# Patient Record
Sex: MALE | Race: WHITE | Employment: FULL TIME | URBAN - METROPOLITAN AREA
[De-identification: names, ages, dates, MRNs, and addresses within clinical notes are randomized per-mention and may not be internally consistent; named-entity substitution may affect disease eponyms.]

---

## 2017-05-01 PROBLEM — Z12.5 SCREENING PSA (PROSTATE SPECIFIC ANTIGEN): Status: ACTIVE | Noted: 2017-05-01

## 2018-05-07 PROBLEM — R68.82 DIMINISHED LIBIDO: Status: ACTIVE | Noted: 2018-05-07

## 2018-11-08 PROBLEM — R80.9 MICROALBUMINURIA: Status: ACTIVE | Noted: 2018-11-08

## 2018-11-11 PROBLEM — Z13.5 SCREENING FOR DIABETIC RETINOPATHY: Status: ACTIVE | Noted: 2018-11-11

## 2018-11-11 PROBLEM — Z23 NEED FOR SHINGLES VACCINE: Status: ACTIVE | Noted: 2018-11-11

## 2018-11-13 PROBLEM — M53.3 SACRAL PAIN: Status: ACTIVE | Noted: 2018-11-13

## 2019-02-16 ENCOUNTER — HOSPITAL ENCOUNTER (OUTPATIENT)
Dept: GENERAL RADIOLOGY | Age: 54
Discharge: HOME | End: 2019-02-16
Payer: COMMERCIAL

## 2019-02-16 DIAGNOSIS — M53.3 SACRAL PAIN: ICD-10-CM

## 2019-02-16 PROBLEM — M25.552 LEFT HIP PAIN: Status: ACTIVE | Noted: 2019-02-16

## 2019-02-16 PROBLEM — M16.12 PRIMARY OSTEOARTHRITIS OF LEFT HIP: Status: ACTIVE | Noted: 2019-02-16

## 2019-02-16 PROCEDURE — 73502 X-RAY EXAM HIP UNI 2-3 VIEWS: CPT

## 2019-09-22 PROBLEM — Z76.89 ENCOUNTER FOR WEIGHT MANAGEMENT: Status: ACTIVE | Noted: 2019-09-22

## 2019-12-29 PROBLEM — M54.2 NECK PAIN: Status: ACTIVE | Noted: 2019-12-29

## 2019-12-29 PROBLEM — K64.9 HEMORRHOIDS: Status: ACTIVE | Noted: 2019-12-29

## 2020-06-14 PROBLEM — M54.2 NECK PAIN: Status: RESOLVED | Noted: 2019-12-29 | Resolved: 2020-06-14

## 2020-06-14 PROBLEM — R80.9 MICROALBUMINURIA: Status: RESOLVED | Noted: 2018-11-08 | Resolved: 2020-06-14

## 2020-06-14 PROBLEM — M53.3 SACRAL PAIN: Status: RESOLVED | Noted: 2018-11-13 | Resolved: 2020-06-14

## 2020-06-14 PROBLEM — Z12.5 SCREENING FOR PROSTATE CANCER: Status: ACTIVE | Noted: 2020-06-14

## 2020-06-14 PROBLEM — R93.1 ELEVATED CORONARY ARTERY CALCIUM SCORE: Status: ACTIVE | Noted: 2020-06-14

## 2020-12-16 PROBLEM — G89.29 CHRONIC LEFT-SIDED LOW BACK PAIN WITH LEFT-SIDED SCIATICA: Status: ACTIVE | Noted: 2020-12-16

## 2020-12-16 PROBLEM — Z79.899 HIGH RISK MEDICATION USE: Status: ACTIVE | Noted: 2020-12-16

## 2020-12-16 PROBLEM — M54.42 CHRONIC LEFT-SIDED LOW BACK PAIN WITH LEFT-SIDED SCIATICA: Status: ACTIVE | Noted: 2020-12-16

## 2021-01-02 ENCOUNTER — HOSPITAL ENCOUNTER (OUTPATIENT)
Dept: GENERAL RADIOLOGY | Age: 56
Discharge: HOME | End: 2021-01-02
Payer: COMMERCIAL

## 2021-01-02 DIAGNOSIS — G89.29 CHRONIC LEFT-SIDED LOW BACK PAIN WITH LEFT-SIDED SCIATICA: ICD-10-CM

## 2021-01-02 DIAGNOSIS — M54.42 CHRONIC LEFT-SIDED LOW BACK PAIN WITH LEFT-SIDED SCIATICA: ICD-10-CM

## 2021-01-02 PROCEDURE — 72100 X-RAY EXAM L-S SPINE 2/3 VWS: CPT

## 2021-01-13 PROBLEM — Z86.16 HISTORY OF 2019 NOVEL CORONAVIRUS DISEASE (COVID-19): Status: ACTIVE | Noted: 2020-12-08

## 2021-05-24 ENCOUNTER — TRANSCRIBE ORDER (OUTPATIENT)
Dept: OCCUPATIONAL MEDICINE | Age: 56
End: 2021-05-24

## 2021-05-24 ENCOUNTER — HOSPITAL ENCOUNTER (OUTPATIENT)
Dept: OCCUPATIONAL MEDICINE | Age: 56
Discharge: HOME | End: 2021-05-24

## 2021-05-24 DIAGNOSIS — T14.90XA INJURY: Primary | ICD-10-CM

## 2021-05-24 DIAGNOSIS — T14.90XA INJURY: ICD-10-CM

## 2021-07-12 NOTE — H&P (VIEW-ONLY)
Name: Homero Carrington  YOB: 1965  Gender: male  MRN: [id number]      CC: Shoulder Pain (L shoulder pre-op appt)       HPI: Homero Carrington is a 61 y.o. male who presents with Shoulder Pain (L shoulder pre-op appt)  . Returns today for preop appointment on his left shoulder. \    Current Outpatient Medications:     oxyCODONE IR (ROXICODONE) 5 mg immediate release tablet, Take 1-2 Tablets by mouth every four (4) hours as needed for Pain for up to 14 days. Max Daily Amount: 60 mg., Disp: 40 Tablet, Rfl: 0    dapagliflozin (Farxiga) 10 mg tab tablet, Take 1 Tablet by mouth daily. , Disp: 90 Tablet, Rfl: 3    Janumet XR 50-1,000 mg TM24, TAKE 2 TABLETS BY MOUTH EVERY DAY, Disp: 180 Tablet, Rfl: 3    celecoxib (CELEBREX) 200 mg capsule, TAKE 1 CAPSULE BY MOUTH TWICE A DAY, Disp: 180 Capsule, Rfl: 3    lisinopriL (PRINIVIL, ZESTRIL) 2.5 mg tablet, TAKE 1 TABLET BY MOUTH EVERY DAY, Disp: 90 Tablet, Rfl: 3    rosuvastatin (CRESTOR) 20 mg tablet, TAKE 1 TABLET BY MOUTH EVERY DAY AT NIGHT, Disp: 90 Tab, Rfl: 3    escitalopram oxalate (LEXAPRO) 10 mg tablet, TAKE 1 TABLET BY MOUTH EVERY DAY, Disp: 90 Tab, Rfl: 3    insulin detemir U-100 (Levemir FlexTouch U-100 Insuln) 100 unit/mL (3 mL) inpn, INJECT 50 UNITS SUBCUTANEOUSLY AT BEDTIME, Disp: 15 Pen, Rfl: 3    glucose blood VI test strips (Accu-Chek Maria Guadalupe Plus test strp) strip, USE AS DIRECTED TO TEST BLOOD SUGARS ONCE DAILY FOR DMII E11.65, Disp: 100 Strip, Rfl: 3    Insulin Needles, Disposable, (BD Ultra-Fine Short Pen Needle) 31 gauge x 5/16\" ndle, Use one needle with each use, Disp: 100 Pen Needle, Rfl: 3    gabapentin (NEURONTIN) 300 mg capsule, Take one cap in AM, on cap PM and two caps at bedtime, Disp: 120 Cap, Rfl: 2    repaglinide (PRANDIN) 2 mg tablet, TAKE 1 TAB BY MOUTH BEFORE BREAKFAST AND DINNER., Disp: 180 Tab, Rfl: 3    colchicine (Colcrys) 0.6 mg tablet, Take 1 Tab by mouth daily.  Take 2 pills on first day, then 1 pill daily thereafter. Indications: acute inflammation of the joints due to gout attack, Disp: 7 Tab, Rfl: 1    Blood-Glucose Meter (ACCU-CHEK ARMANDO PLUS METER) misc, Use as directed to test blood sugars once daily for DMII E11.65, Disp: 1 Each, Rfl: 0    lancets (ACCU-CHEK SOFTCLIX LANCETS) misc, Use as directed to test blood sugars once daily for DMII E11.65, Disp: 100 Each, Rfl: 3    Lancing Device with Lancets (ACCU-CHEK SOFT DEV LANCETS) kit, Use as directed to test blood sugars once daily for DMII E11.65, Disp: 1 Kit, Rfl: 0    omega-3 fatty acids-vitamin e (FISH OIL) 1,000 mg cap, Take 2 Caps by mouth., Disp: , Rfl:     cholecalciferol, vitamin D3, (VITAMIN D3) 2,000 unit tab, Take  by mouth., Disp: , Rfl:     aspirin delayed-release 81 mg tablet, Take  by mouth daily. , Disp: , Rfl:     diclofenac (VOLTAREN) 1 % gel, Apply 4 g to affected area four (4) times daily.  (Patient not taking: Reported on 6/22/2021), Disp: 1 Each, Rfl: 2  Allergies   Allergen Reactions    Plavix [Clopidogrel] Unknown (comments)     Increased risk of bleeding     Stings [Sting, Bee] Unknown (comments)     Past Medical History:   Diagnosis Date    Arthralgia     Arthritis     [facility] disease [facility] [address]     Coronary artery disease     Depression     Diabetes mellitus, type II ([facility] [address] [facility]     Edema     Erectile dysfunction     Hemorrhoids     Hx of colonic polyps 2015    hyperplastic only    Hypercholesterolemia     Hypertension     Hypogonadism male     Insomnia, unspecified     Joint pain     Obesity     Tobacco abuse     Vitamin D deficiency      Past Surgical History:   Procedure Laterality Date    HX COLONOSCOPY  last 12/28/15    Yanick--two trans, five left (all hyperplastic)--7-10 year recall    HX ORTHOPAEDIC Left     ankle 2 screws     Family History   Problem Relation Age of Onset    Coronary Artery Disease Father     Heart Disease Father     Diabetes Mother     Heart Disease Mother      Social History     Socioeconomic History    Marital status:      Spouse name: Not on file    Number of children: Not on file    Years of education: Not on file    Highest education level: Not on file   Occupational History    Not on file   Tobacco Use    Smoking status: Current Every Day Smoker     Packs/day: 1.50     Types: Cigarettes     Start date: 1/1/1983    Smokeless tobacco: Never Used   Substance and Sexual Activity    Alcohol use: Yes     Alcohol/week: 0.0 standard drinks     Comment: rare one glass per year \"Maybe\"    Drug use: No    Sexual activity: Not on file   Other Topics Concern    Not on file   Social History Narrative    Not on file     Social Determinants of Health     Financial Resource Strain:     Difficulty of Paying Living Expenses:    Food Insecurity:     Worried About Running Out of Food in the Last Year:     [address] in the Last Year:    Transportation Needs:     Lack of Transportation (Medical):  Lack of Transportation (Non-Medical):    Physical Activity:     Days of Exercise per Week:     Minutes of Exercise per Session:    Stress:     Feeling of Stress :    Social Connections:     Frequency of Communication with Friends and Family:     Frequency of Social Gatherings with Friends and Family:     Attends [employer] Services:     Active Member of Clubs or Organizations:     Attends Club or Organization Meetings:     Marital Status:    Intimate Partner Violence:     Fear of Current or Ex-Partner:     Emotionally Abused:     Physically Abused:     Sexually Abused:      Physical Examination:  General: no acute distress  HEENT NCAT  Lungs: CTABbreathing easily  CV: regular rate and rhythm  Left Shoulder: Continued pain and weakness in the left shoulder consistent with rotator cuff pathology, tenderness to palpation over the [facility] joint      Imaging:   Traumatic transtendinous rotator cuff tear on MRI  All imaging interpreted by myself Sohail Che MD independent of radiology review         Assessment:     ICD-10-CM ICD-9-CM   1. Traumatic complete tear of left rotator cuff, subsequent encounter  S46.012D V58.89     840.4        Plan:   We again reviewed my recommendation for and discussed the details of arthroscopic rotator cuff repair subacromial decompression distal clavicle resection and possible biceps tenodesis. I expressed to him I do have concerns with his diabetes as well as how much he is smoking and this may compromise his outcome. He commits to cut his smoking down and control his blood sugar as much as possible. We did discuss the details risks and benefits of shoulder arthroscopic rotator cuff repair with subacromial decompression and likely biceps tenotomy versus tenodesis including but not limited to anesthetic complications postoperative numbness stiffness, nerve block complications, postoperative stiffness, infection, need for further surgery, and the extensive rehab course associated with the procedure as well as likely a 6 to 12-month full recovery from the procedure. All of their questions have been answered and the patient elects to proceed as planned        Follow up         Sohail Hitchcock MD, [address] [place] [address] and Sports Medicine

## 2021-07-14 ENCOUNTER — ANESTHESIA EVENT (OUTPATIENT)
Dept: SURGERY | Age: 56
End: 2021-07-14
Payer: OTHER MISCELLANEOUS

## 2021-07-15 ENCOUNTER — ANESTHESIA (OUTPATIENT)
Dept: SURGERY | Age: 56
End: 2021-07-15
Payer: OTHER MISCELLANEOUS

## 2021-07-15 ENCOUNTER — HOSPITAL ENCOUNTER (OUTPATIENT)
Age: 56
Setting detail: OUTPATIENT SURGERY
Discharge: HOME | End: 2021-07-15
Attending: ORTHOPAEDIC SURGERY | Admitting: ORTHOPAEDIC SURGERY
Payer: OTHER MISCELLANEOUS

## 2021-07-15 VITALS
RESPIRATION RATE: 16 BRPM | DIASTOLIC BLOOD PRESSURE: 68 MMHG | HEART RATE: 65 BPM | WEIGHT: 200 LBS | SYSTOLIC BLOOD PRESSURE: 113 MMHG | TEMPERATURE: 97.7 F | OXYGEN SATURATION: 92 % | BODY MASS INDEX: 28.7 KG/M2

## 2021-07-15 LAB
GLUCOSE BLD STRIP.AUTO-MCNC: 158 MG/DL (ref 65–100)
GLUCOSE BLD STRIP.AUTO-MCNC: 168 MG/DL (ref 65–100)
SERVICE CMNT-IMP: ABNORMAL
SERVICE CMNT-IMP: ABNORMAL

## 2021-07-15 PROCEDURE — 77030037837: Performed by: ORTHOPAEDIC SURGERY

## 2021-07-15 PROCEDURE — 74011000250 HC RX REV CODE- 250: Performed by: NURSE ANESTHETIST, CERTIFIED REGISTERED

## 2021-07-15 PROCEDURE — 76010000172 HC OR TIME 2.5 TO 3 HR INTENSV-TIER 1: Performed by: ORTHOPAEDIC SURGERY

## 2021-07-15 PROCEDURE — 77030002933 HC SUT MCRYL J&J -A: Performed by: ORTHOPAEDIC SURGERY

## 2021-07-15 PROCEDURE — 76210000021 HC REC RM PH II 0.5 TO 1 HR: Performed by: ORTHOPAEDIC SURGERY

## 2021-07-15 PROCEDURE — C1713 ANCHOR/SCREW BN/BN,TIS/BN: HCPCS | Performed by: ORTHOPAEDIC SURGERY

## 2021-07-15 PROCEDURE — 77030027384 HC PRB ARTHSCP SERFAS STRY -C: Performed by: ORTHOPAEDIC SURGERY

## 2021-07-15 PROCEDURE — 77030042407 HC SUT FBRWRE -B: Performed by: ORTHOPAEDIC SURGERY

## 2021-07-15 PROCEDURE — 77030004453 HC BUR SHV STRY -B: Performed by: ORTHOPAEDIC SURGERY

## 2021-07-15 PROCEDURE — 76210000006 HC OR PH I REC 0.5 TO 1 HR: Performed by: ORTHOPAEDIC SURGERY

## 2021-07-15 PROCEDURE — 74011250636 HC RX REV CODE- 250/636: Performed by: ORTHOPAEDIC SURGERY

## 2021-07-15 PROCEDURE — 77030033005 HC TBNG ARTHSC PMP STRY -B: Performed by: ORTHOPAEDIC SURGERY

## 2021-07-15 PROCEDURE — 74011250636 HC RX REV CODE- 250/636: Performed by: ANESTHESIOLOGY

## 2021-07-15 PROCEDURE — 77030019605: Performed by: ORTHOPAEDIC SURGERY

## 2021-07-15 PROCEDURE — 77030040361 HC SLV COMPR DVT MDII -B: Performed by: ORTHOPAEDIC SURGERY

## 2021-07-15 PROCEDURE — 77030013316: Performed by: ORTHOPAEDIC SURGERY

## 2021-07-15 PROCEDURE — 77030039425 HC BLD LARYNG TRULITE DISP TELE -A: Performed by: ANESTHESIOLOGY

## 2021-07-15 PROCEDURE — 74011250637 HC RX REV CODE- 250/637: Performed by: ANESTHESIOLOGY

## 2021-07-15 PROCEDURE — 76010010054 HC POST OP PAIN BLOCK: Performed by: ORTHOPAEDIC SURGERY

## 2021-07-15 PROCEDURE — 77030037717 HC BIT DRL SUT TAK KT -ARTH -D: Performed by: ORTHOPAEDIC SURGERY

## 2021-07-15 PROCEDURE — 76060000036 HC ANESTHESIA 2.5 TO 3 HR: Performed by: ORTHOPAEDIC SURGERY

## 2021-07-15 PROCEDURE — 77030003602 HC NDL NRV BLK BBMI -B: Performed by: ANESTHESIOLOGY

## 2021-07-15 PROCEDURE — 77030002966 HC SUT PDS J&J -A: Performed by: ORTHOPAEDIC SURGERY

## 2021-07-15 PROCEDURE — 77030018673: Performed by: ORTHOPAEDIC SURGERY

## 2021-07-15 PROCEDURE — 76942 ECHO GUIDE FOR BIOPSY: CPT | Performed by: ORTHOPAEDIC SURGERY

## 2021-07-15 PROCEDURE — 77030040922 HC BLNKT HYPOTHRM STRY -A: Performed by: ANESTHESIOLOGY

## 2021-07-15 PROCEDURE — 2709999900 HC NON-CHARGEABLE SUPPLY: Performed by: ORTHOPAEDIC SURGERY

## 2021-07-15 PROCEDURE — 82962 GLUCOSE BLOOD TEST: CPT

## 2021-07-15 PROCEDURE — 77030006872 HC BLD SHV CUTT STRY -B: Performed by: ORTHOPAEDIC SURGERY

## 2021-07-15 PROCEDURE — 77030010428: Performed by: ORTHOPAEDIC SURGERY

## 2021-07-15 PROCEDURE — 77030037088 HC TUBE ENDOTRACH ORAL NSL COVD-A: Performed by: ANESTHESIOLOGY

## 2021-07-15 PROCEDURE — 74011250636 HC RX REV CODE- 250/636: Performed by: NURSE ANESTHETIST, CERTIFIED REGISTERED

## 2021-07-15 PROCEDURE — 74011000250 HC RX REV CODE- 250: Performed by: ANESTHESIOLOGY

## 2021-07-15 DEVICE — ANCHOR SUTURE BIOCOMP 4.75X19.1 MM SWIVELOCK C: Type: IMPLANTABLE DEVICE | Site: SHOULDER | Status: FUNCTIONAL

## 2021-07-15 DEVICE — ANCHOR SUT DIA2.6MM 3 LD W/ 3 1.3MM WHT/BLUE WHT/BLACK AND: Type: IMPLANTABLE DEVICE | Site: SHOULDER | Status: FUNCTIONAL

## 2021-07-15 RX ORDER — DIPHENHYDRAMINE HYDROCHLORIDE 50 MG/ML
12.5 INJECTION, SOLUTION INTRAMUSCULAR; INTRAVENOUS
Status: DISCONTINUED | OUTPATIENT
Start: 2021-07-15 | End: 2021-07-15 | Stop reason: HOSPADM

## 2021-07-15 RX ORDER — OXYCODONE HYDROCHLORIDE 5 MG/1
5 TABLET ORAL
Status: DISCONTINUED | OUTPATIENT
Start: 2021-07-15 | End: 2021-07-15 | Stop reason: HOSPADM

## 2021-07-15 RX ORDER — SODIUM CHLORIDE 0.9 % (FLUSH) 0.9 %
5-40 SYRINGE (ML) INJECTION EVERY 8 HOURS
Status: DISCONTINUED | OUTPATIENT
Start: 2021-07-15 | End: 2021-07-15 | Stop reason: HOSPADM

## 2021-07-15 RX ORDER — FAMOTIDINE 20 MG/1
20 TABLET, FILM COATED ORAL ONCE
Status: COMPLETED | OUTPATIENT
Start: 2021-07-15 | End: 2021-07-15

## 2021-07-15 RX ORDER — OXYCODONE AND ACETAMINOPHEN 5; 325 MG/1; MG/1
1 TABLET ORAL AS NEEDED
Status: DISCONTINUED | OUTPATIENT
Start: 2021-07-15 | End: 2021-07-15 | Stop reason: HOSPADM

## 2021-07-15 RX ORDER — SODIUM CHLORIDE, SODIUM LACTATE, POTASSIUM CHLORIDE, CALCIUM CHLORIDE 600; 310; 30; 20 MG/100ML; MG/100ML; MG/100ML; MG/100ML
100 INJECTION, SOLUTION INTRAVENOUS CONTINUOUS
Status: DISCONTINUED | OUTPATIENT
Start: 2021-07-15 | End: 2021-07-15 | Stop reason: HOSPADM

## 2021-07-15 RX ORDER — NEOSTIGMINE METHYLSULFATE 1 MG/ML
INJECTION, SOLUTION INTRAVENOUS AS NEEDED
Status: DISCONTINUED | OUTPATIENT
Start: 2021-07-15 | End: 2021-07-15 | Stop reason: HOSPADM

## 2021-07-15 RX ORDER — ROCURONIUM BROMIDE 10 MG/ML
INJECTION, SOLUTION INTRAVENOUS AS NEEDED
Status: DISCONTINUED | OUTPATIENT
Start: 2021-07-15 | End: 2021-07-15 | Stop reason: HOSPADM

## 2021-07-15 RX ORDER — HYDROMORPHONE HYDROCHLORIDE 1 MG/ML
0.5 INJECTION, SOLUTION INTRAMUSCULAR; INTRAVENOUS; SUBCUTANEOUS
Status: DISCONTINUED | OUTPATIENT
Start: 2021-07-15 | End: 2021-07-15 | Stop reason: HOSPADM

## 2021-07-15 RX ORDER — PROPOFOL 10 MG/ML
INJECTION, EMULSION INTRAVENOUS AS NEEDED
Status: DISCONTINUED | OUTPATIENT
Start: 2021-07-15 | End: 2021-07-15 | Stop reason: HOSPADM

## 2021-07-15 RX ORDER — SODIUM CHLORIDE 9 MG/ML
50 INJECTION, SOLUTION INTRAVENOUS CONTINUOUS
Status: DISCONTINUED | OUTPATIENT
Start: 2021-07-15 | End: 2021-07-15 | Stop reason: HOSPADM

## 2021-07-15 RX ORDER — LIDOCAINE HYDROCHLORIDE 20 MG/ML
INJECTION, SOLUTION EPIDURAL; INFILTRATION; INTRACAUDAL; PERINEURAL
Status: COMPLETED | OUTPATIENT
Start: 2021-07-15 | End: 2021-07-15

## 2021-07-15 RX ORDER — CEFAZOLIN SODIUM/WATER 2 G/20 ML
2 SYRINGE (ML) INTRAVENOUS ONCE
Status: COMPLETED | OUTPATIENT
Start: 2021-07-15 | End: 2021-07-15

## 2021-07-15 RX ORDER — ONDANSETRON 2 MG/ML
INJECTION INTRAMUSCULAR; INTRAVENOUS AS NEEDED
Status: DISCONTINUED | OUTPATIENT
Start: 2021-07-15 | End: 2021-07-15 | Stop reason: HOSPADM

## 2021-07-15 RX ORDER — SODIUM CHLORIDE 0.9 % (FLUSH) 0.9 %
5-40 SYRINGE (ML) INJECTION AS NEEDED
Status: DISCONTINUED | OUTPATIENT
Start: 2021-07-15 | End: 2021-07-15 | Stop reason: HOSPADM

## 2021-07-15 RX ORDER — MIDAZOLAM HYDROCHLORIDE 1 MG/ML
2 INJECTION, SOLUTION INTRAMUSCULAR; INTRAVENOUS
Status: DISCONTINUED | OUTPATIENT
Start: 2021-07-15 | End: 2021-07-15 | Stop reason: HOSPADM

## 2021-07-15 RX ORDER — MIDAZOLAM HYDROCHLORIDE 1 MG/ML
2 INJECTION, SOLUTION INTRAMUSCULAR; INTRAVENOUS ONCE
Status: COMPLETED | OUTPATIENT
Start: 2021-07-15 | End: 2021-07-15

## 2021-07-15 RX ORDER — BUPIVACAINE HYDROCHLORIDE AND EPINEPHRINE 5; 5 MG/ML; UG/ML
INJECTION, SOLUTION EPIDURAL; INTRACAUDAL; PERINEURAL
Status: COMPLETED | OUTPATIENT
Start: 2021-07-15 | End: 2021-07-15

## 2021-07-15 RX ORDER — SODIUM CHLORIDE, SODIUM LACTATE, POTASSIUM CHLORIDE, CALCIUM CHLORIDE 600; 310; 30; 20 MG/100ML; MG/100ML; MG/100ML; MG/100ML
75 INJECTION, SOLUTION INTRAVENOUS CONTINUOUS
Status: DISCONTINUED | OUTPATIENT
Start: 2021-07-15 | End: 2021-07-15 | Stop reason: HOSPADM

## 2021-07-15 RX ORDER — FENTANYL CITRATE 50 UG/ML
25 INJECTION, SOLUTION INTRAMUSCULAR; INTRAVENOUS ONCE
Status: COMPLETED | OUTPATIENT
Start: 2021-07-15 | End: 2021-07-15

## 2021-07-15 RX ORDER — DEXAMETHASONE SODIUM PHOSPHATE 4 MG/ML
INJECTION, SOLUTION INTRA-ARTICULAR; INTRALESIONAL; INTRAMUSCULAR; INTRAVENOUS; SOFT TISSUE AS NEEDED
Status: DISCONTINUED | OUTPATIENT
Start: 2021-07-15 | End: 2021-07-15 | Stop reason: HOSPADM

## 2021-07-15 RX ORDER — LIDOCAINE HYDROCHLORIDE 10 MG/ML
0.1 INJECTION INFILTRATION; PERINEURAL AS NEEDED
Status: DISCONTINUED | OUTPATIENT
Start: 2021-07-15 | End: 2021-07-15 | Stop reason: HOSPADM

## 2021-07-15 RX ORDER — GLYCOPYRROLATE 0.2 MG/ML
INJECTION INTRAMUSCULAR; INTRAVENOUS AS NEEDED
Status: DISCONTINUED | OUTPATIENT
Start: 2021-07-15 | End: 2021-07-15 | Stop reason: HOSPADM

## 2021-07-15 RX ORDER — BUPIVACAINE HYDROCHLORIDE AND EPINEPHRINE 2.5; 5 MG/ML; UG/ML
INJECTION, SOLUTION EPIDURAL; INFILTRATION; INTRACAUDAL; PERINEURAL
Status: COMPLETED | OUTPATIENT
Start: 2021-07-15 | End: 2021-07-15

## 2021-07-15 RX ORDER — EPINEPHRINE 1 MG/ML
INJECTION INTRAMUSCULAR; INTRAVENOUS; SUBCUTANEOUS AS NEEDED
Status: DISCONTINUED | OUTPATIENT
Start: 2021-07-15 | End: 2021-07-15 | Stop reason: HOSPADM

## 2021-07-15 RX ORDER — LIDOCAINE HYDROCHLORIDE 20 MG/ML
INJECTION, SOLUTION EPIDURAL; INFILTRATION; INTRACAUDAL; PERINEURAL AS NEEDED
Status: DISCONTINUED | OUTPATIENT
Start: 2021-07-15 | End: 2021-07-15 | Stop reason: HOSPADM

## 2021-07-15 RX ADMIN — PROPOFOL 200 MCG: 10 INJECTION, EMULSION INTRAVENOUS at 09:16

## 2021-07-15 RX ADMIN — LIDOCAINE HYDROCHLORIDE 10 ML: 20 INJECTION, SOLUTION EPIDURAL; INFILTRATION; INTRACAUDAL; PERINEURAL at 08:24

## 2021-07-15 RX ADMIN — GLYCOPYRROLATE 0.4 MG: 0.2 INJECTION, SOLUTION INTRAMUSCULAR; INTRAVENOUS at 11:56

## 2021-07-15 RX ADMIN — PROPOFOL 50 MCG: 10 INJECTION, EMULSION INTRAVENOUS at 10:19

## 2021-07-15 RX ADMIN — FAMOTIDINE 20 MG: 20 TABLET, FILM COATED ORAL at 08:11

## 2021-07-15 RX ADMIN — PHENYLEPHRINE HYDROCHLORIDE 100 MCG: 10 INJECTION INTRAVENOUS at 10:04

## 2021-07-15 RX ADMIN — PHENYLEPHRINE HYDROCHLORIDE 100 MCG: 10 INJECTION INTRAVENOUS at 09:57

## 2021-07-15 RX ADMIN — PHENYLEPHRINE HYDROCHLORIDE 150 MCG: 10 INJECTION INTRAVENOUS at 11:23

## 2021-07-15 RX ADMIN — FENTANYL CITRATE 25 MCG: 50 INJECTION, SOLUTION INTRAMUSCULAR; INTRAVENOUS at 08:21

## 2021-07-15 RX ADMIN — ROCURONIUM BROMIDE 50 MG: 10 INJECTION, SOLUTION INTRAVENOUS at 09:22

## 2021-07-15 RX ADMIN — PHENYLEPHRINE HYDROCHLORIDE 150 MCG: 10 INJECTION INTRAVENOUS at 10:55

## 2021-07-15 RX ADMIN — Medication 3 MG: at 11:56

## 2021-07-15 RX ADMIN — MIDAZOLAM 2 MG: 1 INJECTION INTRAMUSCULAR; INTRAVENOUS at 08:21

## 2021-07-15 RX ADMIN — SODIUM CHLORIDE, SODIUM LACTATE, POTASSIUM CHLORIDE, AND CALCIUM CHLORIDE: 600; 310; 30; 20 INJECTION, SOLUTION INTRAVENOUS at 10:13

## 2021-07-15 RX ADMIN — BUPIVACAINE HYDROCHLORIDE AND EPINEPHRINE BITARTRATE 15 ML: 5; .005 INJECTION, SOLUTION EPIDURAL; INTRACAUDAL; PERINEURAL at 08:24

## 2021-07-15 RX ADMIN — LIDOCAINE HYDROCHLORIDE 100 MG: 20 INJECTION, SOLUTION EPIDURAL; INFILTRATION; INTRACAUDAL; PERINEURAL at 09:22

## 2021-07-15 RX ADMIN — SODIUM CHLORIDE, SODIUM LACTATE, POTASSIUM CHLORIDE, AND CALCIUM CHLORIDE 75 ML/HR: 600; 310; 30; 20 INJECTION, SOLUTION INTRAVENOUS at 08:11

## 2021-07-15 RX ADMIN — PROPOFOL 30 MCG: 10 INJECTION, EMULSION INTRAVENOUS at 08:21

## 2021-07-15 RX ADMIN — DEXAMETHASONE SODIUM PHOSPHATE 4 MG: 4 INJECTION, SOLUTION INTRAMUSCULAR; INTRAVENOUS at 10:08

## 2021-07-15 RX ADMIN — PHENYLEPHRINE HYDROCHLORIDE 100 MCG: 10 INJECTION INTRAVENOUS at 11:38

## 2021-07-15 RX ADMIN — BUPIVACAINE HYDROCHLORIDE AND EPINEPHRINE BITARTRATE 15 ML: 2.5; .005 INJECTION, SOLUTION EPIDURAL; INFILTRATION; INTRACAUDAL; PERINEURAL at 08:24

## 2021-07-15 RX ADMIN — CEFAZOLIN 2 G: 1 INJECTION, POWDER, FOR SOLUTION INTRAVENOUS at 09:36

## 2021-07-15 RX ADMIN — ONDANSETRON 4 MG: 2 INJECTION INTRAMUSCULAR; INTRAVENOUS at 10:08

## 2021-07-15 RX ADMIN — PROPOFOL 30 MCG: 10 INJECTION, EMULSION INTRAVENOUS at 11:11

## 2021-07-15 NOTE — ANESTHESIA PROCEDURE NOTES
Peripheral Block    Start time: 7/15/2021 8:21 AM  End time: 7/15/2021 8:24 AM  Performed by: Lashay Delvalle MD  Authorized by: Lashay Delvalle MD       Pre-procedure: Indications: at surgeon's request and post-op pain management    Preanesthetic Checklist: patient identified, risks and benefits discussed, site marked, timeout performed, anesthesia consent given and patient being monitored    Timeout Time: 08:21 EDT          Block Type:   Block Type:   Interscalene  Laterality:  Left  Monitoring:  Standard ASA monitoring, continuous pulse ox, frequent vital sign checks, heart rate and oxygen  Injection Technique:  Single shot  Procedures: ultrasound guided and nerve stimulator    Patient Position: supine  Prep: chlorhexidine    Location:  Interscalene  Needle Type:  Stimuplex  Needle Gauge:  22 G  Needle Localization:  Nerve stimulator and ultrasound guidance  Motor Response comment:   Motor Response: minimal motor response >0.4 mA   Medication Injected:  Bupivacaine 0.25% -EPINEPHrine 1:200,000 (SENSORCAINE) mg injection, 15 mL  bupivacaine-EPINEPHrine (PF)(SENSORCAINE) 0.5%-1:200,000 mg injection, 15 mL  lidocaine (XYLOCAINE) Preserv-Free 2% injection, 10 mL  Med Admin Time: 7/15/2021 8:24 AM    Assessment:  Number of attempts:  1  Injection Assessment:  Local visualized surrounding nerve on ultrasound, negative aspiration for CSF, negative aspiration for blood, no paresthesia, no intravascular symptoms, ultrasound image on chart and incremental injection every 5 mL  Patient tolerance:  Patient tolerated the procedure well with no immediate complications

## 2021-07-15 NOTE — OP NOTES
Operative Report    Patient: Vandana Dawson MRN: [id number]  SSN: [id number]    YOB: 1965  Age: 61 y.o. Sex: male       Date of Surgery: 7/15/2021     Preoperative Diagnosis:   1) Left Shoulder Rotator cuff tear  2) Left Shoulder outlet impingement  3) Left Shoulder biceps tendinitis  4) left shoulder AC joint arthritis    Postoperative Diagnosis: Same as above     Surgeon(s) and Role:     * Rose Marie Jaramillo MD - Primary    Anesthesia: General + ISB block    Procedure: 1) Left Shoulder Arthroscopic Rotator Cuff Repair  2) Left shoulder arthroscopy with biceps tenodesis   3) left shoulder arthroscopy distal clavicle resection  4) Left Shoulder Arthroscopy with subacromial decompression  5) Left shoulder arthroscopy with debridement including superior labrum,rotator interval, subscapularis, greater tuberosity and subacromial bursa       Estimated Blood Loss:  10 mL      Implants: Arthrex fibertak x 2 medial row, swivel lock x 2 lateral, swivel lock for tenodesis           Specimens: * No specimens in log *        Drains: None                Complications: None    Counts: Sponge and needle counts were correct times two. Procedure in Detail: After informed consent was obtained the surgical site was marked in the preoperative area by myself and the patient was brought to the operating room and general anesthesia was induced. The patient was positioned in the beachchair position with all bony prominences well-padded and the operative shoulder was prepped and draped in the usual orthopedic sterile fashion. Timeout was performed per protocol antibiotics given per protocol. Initially a standard posterior lateral arthroscopy viewing portal was established. Diagnostic arthroscopy was carried out. There was dense of the articular surface of the humeral head and glenoid. The subscapularis was largely intact with some split tearing near the insertion.  Rotator interval had significant synovitis and inflammation. Anterior interval portal was established under spinal needle guidance. Subscapularis was probed and noted to be intact and stable with good tension but had some split tearing which was debrided using motorized shaver to get back to healthy tendon. biceps tendon was pulled into the joint and had extensive tenosynovitis and some fraying. superior labrum was probed and demonstrated degenerative SLAP tear. Anterior and posterior labrum were intact. Undersurface of the infraspinatus and supraspinatus demonstrated full-thickness tear which was transtendinous and traumatic    We made the decision to proceed with a biceps tenodesis. We created an accessory portal and the anterior interval over the biceps tendon. We placed a grasper on the biceps tendon and then used an RF device to tenotomized the biceps just distal to the biceps labral anchor. We used a shaver to debride the superior labrum back to a stable rim stabilizing the superior labral tear as well as the biceps anchor. We then were able to pull out the long head of the biceps out of the anterior portal and placed a fiber loop running whipstitch on tendon. And then through a cannula placed this into a knotless 4.75 mm swivel lock anchor in the center of the bicipital groove tensioning this appropriately. Arthroscope was then placed in the subacromial space there was an extensive amount of subacromial bursitis we established a lateral portal under spinal needle guidance and performed a subacromial bursectomy using a combination of an RF device and a motorized shaver. We exposed the undersurface of the acromion with an RF device. This revealed type II morphology with significant anterior inferior downsloping and a tight outlet.   This extended medially to the [facility] joint We then used a motorized bur from the lateral portal and performed a subacromial decompression resecting the anterior inferior downsloping and elevating this to create a flat elevated bleeding surface. RF device was used to expose lateral aspect of the clavicle as well as the medial acromion exposing the [facility] joint which had advanced degenerative changes. We then switched the arthroscope to the lateral portal and work from the anterior portal exposing the end of the distal clavicle using an RF device. There was significant osteophyte formation and joint space narrowing. We used a motorized bur to resect a few millimeters of the acromion and then turned our attention to the distal clavicle. Motorized bur was used to perform a distal clavicle resection resecting 8 to 10 mm of space at the [facility] joint addressing all osteophytes. RF device was used to obtain hemostasis. Rotator cuff from the bursal side demonstrated a transtendinous traumatic  U shaped tear of approximately 2.5-3 cm. Guanako Loaiza was used to debride the traumatic torn tissue back to healthy tendon. Articular margin was identified with an RF device greater tuberosity was gently decorticated using motorized shaver in preparation for healing. We elected to proceed with a double row repair placing fiber tack anchors medially in the medial row with suture tapes. We passed all 4 pairs of sutures in horizontal mattress fashion working anterior to posteriorly and incorporating the transtendinous portion posteriorly. All of the medial row were tied with sliding locking arthroscopic knots backed up with alternating half inches. 1 limb of each suture was then crossed to 2 knotless lateral row swivel lock anchors to create a transosseous equivalent double row rotator cuff repair. I was pleased with the quality of the bone and the repair the cuff was moving as a unit with the tuberosity upon rotation of the arm. Shoulder was then drained portal sites were closed with buried Monocryl suture and Steri-Strips sterile dressing cryotherapy device and sling and abduction pillow were applied.    Clinton tolerated the procedure well was awakened and transferred to the PACU in stable condition    Discharge plan:  Disposition: Home  Rehab protocol: Less than 3 cm rotator cuff repair and biceps tenodesis protocol.         Signed By:  So Echeverria MD     July 15, 2021

## 2021-07-15 NOTE — ANESTHESIA POSTPROCEDURE EVALUATION
Procedure(s):  LEFT SHOULDER ARTHROSCOPY SUBACROMIAL DECOMPRESSION ROTATOR CUFF REPAIR/ DISTAL CLAVICLE RESECTION/ DEBRIDEMENT AND POSS BICEPS TENODESIS . general    Anesthesia Post Evaluation      Multimodal analgesia: multimodal analgesia used between 6 hours prior to anesthesia start to PACU discharge  Patient location during evaluation: bedside  Patient participation: complete - patient participated  Level of consciousness: awake  Pain management: adequate  Airway patency: patent  Anesthetic complications: no  Cardiovascular status: acceptable and stable  Respiratory status: acceptable and room air  Hydration status: acceptable  Post anesthesia nausea and vomiting:  none  Final Post Anesthesia Temperature Assessment:  Normothermia (36.0-37.5 degrees C)      INITIAL Post-op Vital signs:   Vitals Value Taken Time   /60 07/15/21 1240   Temp 36.5 °C (97.7 °F) 07/15/21 1211   Pulse 58 07/15/21 1242   Resp 16 07/15/21 1240   SpO2 92 % 07/15/21 1242   Vitals shown include unvalidated device data.

## 2021-07-15 NOTE — ANESTHESIA PREPROCEDURE EVALUATION
Anesthetic History   No history of anesthetic complications            Review of Systems / Medical History  Patient summary reviewed and pertinent labs reviewed    Pulmonary          Smoker         Neuro/Psych              Cardiovascular              CAD and hyperlipidemia  Pertinent negatives: Hypertension: denies; on for renal protection.   Exercise tolerance: >4 METS     GI/Hepatic/Renal  Within defined limits              Endo/Other    Diabetes: well controlled, type 2, using insulin    Arthritis     Other Findings   Comments: depression           Physical Exam    Airway  Mallampati: II  TM Distance: 4 - 6 cm  Neck ROM: normal range of motion   Mouth opening: Normal     Cardiovascular    Rhythm: regular  Rate: normal         Dental  No notable dental hx    Comments: Multiple missing   Pulmonary  Breath sounds clear to auscultation               Abdominal  GI exam deferred       Other Findings            Anesthetic Plan    ASA: 2  Anesthesia type: general      Post-op pain plan if not by surgeon: peripheral nerve block single    Induction: Intravenous  Anesthetic plan and risks discussed with: Patient

## 2021-07-15 NOTE — INTERVAL H&P NOTE
Update History & Physical    The Patient's History and Physical was reviewed   I discussed the surgery and patients medical condition with the patient. The chart was reviewed with the patient and I examined the patient. There was no change from previous note. The surgical site was confirmed by the patient and me. CV: RRR  RESP: CTAB    Plan:  The risk, benefits, expected outcome, and alternative to the recommended procedure have been discussed with the patient. Patient understands and elects to proceed with the procedure as planned.     Electronically signed by Ag Olivera MD on 07/15/21 8:08 AM

## 2021-09-28 PROBLEM — Z79.899 HIGH RISK MEDICATION USE: Chronic | Status: ACTIVE | Noted: 2020-12-16

## 2021-09-28 PROBLEM — Z00.00 WELL ADULT EXAM: Chronic | Status: ACTIVE | Noted: 2021-09-28

## 2021-09-28 PROBLEM — Z12.5 SCREENING FOR PROSTATE CANCER: Chronic | Status: ACTIVE | Noted: 2020-06-14

## 2021-09-28 PROBLEM — I10 BENIGN ESSENTIAL HTN: Status: ACTIVE | Noted: 2021-09-28

## 2021-09-28 PROBLEM — Z00.00 WELL ADULT EXAM: Status: ACTIVE | Noted: 2021-09-28

## 2021-09-28 PROBLEM — Z23 COVID-19 VACCINE SERIES STARTED: Status: ACTIVE | Noted: 2021-09-28

## 2021-10-28 PROBLEM — Z23 COVID-19 VACCINE SERIES STARTED: Status: RESOLVED | Noted: 2021-09-28 | Resolved: 2021-10-28

## 2022-01-02 PROBLEM — Z91.89 10 YEAR RISK OF MI OR STROKE 7.5% OR GREATER: Status: ACTIVE | Noted: 2022-01-02

## 2022-01-02 PROBLEM — Z92.29 COVID-19 VACCINE SERIES COMPLETED: Status: ACTIVE | Noted: 2022-01-02

## 2022-03-18 PROBLEM — Z00.00 WELL ADULT EXAM: Status: ACTIVE | Noted: 2021-09-28

## 2022-03-18 PROBLEM — M25.552 LEFT HIP PAIN: Status: ACTIVE | Noted: 2019-02-16

## 2022-03-18 PROBLEM — M16.12 PRIMARY OSTEOARTHRITIS OF LEFT HIP: Status: ACTIVE | Noted: 2019-02-16

## 2022-03-19 PROBLEM — R68.82 DIMINISHED LIBIDO: Status: ACTIVE | Noted: 2018-05-07

## 2022-03-19 PROBLEM — Z92.29 COVID-19 VACCINE SERIES COMPLETED: Status: ACTIVE | Noted: 2022-01-02

## 2022-03-19 PROBLEM — Z12.5 SCREENING FOR PROSTATE CANCER: Status: ACTIVE | Noted: 2020-06-14

## 2022-03-19 PROBLEM — K64.9 HEMORRHOIDS: Status: ACTIVE | Noted: 2019-12-29

## 2022-03-19 PROBLEM — Z86.16 HISTORY OF 2019 NOVEL CORONAVIRUS DISEASE (COVID-19): Status: ACTIVE | Noted: 2020-12-08

## 2022-03-19 PROBLEM — M54.42 CHRONIC LEFT-SIDED LOW BACK PAIN WITH LEFT-SIDED SCIATICA: Status: ACTIVE | Noted: 2020-12-16

## 2022-03-19 PROBLEM — R93.1 ELEVATED CORONARY ARTERY CALCIUM SCORE: Status: ACTIVE | Noted: 2020-06-14

## 2022-03-19 PROBLEM — G89.29 CHRONIC LEFT-SIDED LOW BACK PAIN WITH LEFT-SIDED SCIATICA: Status: ACTIVE | Noted: 2020-12-16

## 2022-03-19 PROBLEM — I10 BENIGN ESSENTIAL HTN: Status: ACTIVE | Noted: 2021-09-28

## 2022-03-19 PROBLEM — Z91.89 10 YEAR RISK OF MI OR STROKE 7.5% OR GREATER: Status: ACTIVE | Noted: 2022-01-02

## 2022-03-20 PROBLEM — Z79.899 HIGH RISK MEDICATION USE: Status: ACTIVE | Noted: 2020-12-16

## 2022-05-31 ENCOUNTER — TELEPHONE (OUTPATIENT)
Dept: FAMILY MEDICINE CLINIC | Facility: CLINIC | Age: 57
End: 2022-05-31

## 2022-05-31 DIAGNOSIS — E11.65 TYPE 2 DIABETES MELLITUS WITH HYPERGLYCEMIA, WITH LONG-TERM CURRENT USE OF INSULIN (HCC): Primary | ICD-10-CM

## 2022-05-31 DIAGNOSIS — Z79.4 TYPE 2 DIABETES MELLITUS WITH HYPERGLYCEMIA, WITH LONG-TERM CURRENT USE OF INSULIN (HCC): Primary | ICD-10-CM

## 2022-05-31 RX ORDER — INSULIN DETEMIR 100 [IU]/ML
47.4 INJECTION, SOLUTION SUBCUTANEOUS NIGHTLY
Qty: 2 PEN | Refills: 0 | Status: SHIPPED | OUTPATIENT
Start: 2022-05-31 | End: 2022-06-28 | Stop reason: SDUPTHER

## 2022-05-31 NOTE — TELEPHONE ENCOUNTER
Pt javier Richey called to [facility] in [place]. Pharmacy confirmed no refills. He uses 50 U.

## 2022-06-22 DIAGNOSIS — I25.10 ATHEROSCLEROTIC HEART DISEASE OF NATIVE CORONARY ARTERY WITHOUT ANGINA PECTORIS: ICD-10-CM

## 2022-06-26 DIAGNOSIS — Z79.4 TYPE 2 DIABETES MELLITUS WITH HYPERGLYCEMIA, WITH LONG-TERM CURRENT USE OF INSULIN (HCC): ICD-10-CM

## 2022-06-26 DIAGNOSIS — E11.65 TYPE 2 DIABETES MELLITUS WITH HYPERGLYCEMIA, WITH LONG-TERM CURRENT USE OF INSULIN (HCC): ICD-10-CM

## 2022-06-26 RX ORDER — INSULIN DETEMIR 100 [IU]/ML
47.4 INJECTION, SOLUTION SUBCUTANEOUS NIGHTLY
Qty: 2 PEN | Refills: 0 | Status: CANCELLED | OUTPATIENT
Start: 2022-06-26

## 2022-06-27 DIAGNOSIS — Z79.4 TYPE 2 DIABETES MELLITUS WITH HYPERGLYCEMIA, WITH LONG-TERM CURRENT USE OF INSULIN (HCC): ICD-10-CM

## 2022-06-27 DIAGNOSIS — E11.65 TYPE 2 DIABETES MELLITUS WITH HYPERGLYCEMIA, WITH LONG-TERM CURRENT USE OF INSULIN (HCC): ICD-10-CM

## 2022-06-27 NOTE — TELEPHONE ENCOUNTER
Pt needs Janumet  mg, Farxiga 10 mg, Crestor 20 mg, Celebrex 200 mg, Levemir 100 U sent to [facility] in [place]. He will run out tomorrow. Future appt scheduled.

## 2022-06-29 RX ORDER — ROSUVASTATIN CALCIUM 20 MG/1
TABLET, COATED ORAL
Qty: 90 TABLET | Refills: 3 | Status: SHIPPED | OUTPATIENT
Start: 2022-06-29

## 2022-06-30 RX ORDER — CELECOXIB 200 MG/1
200 CAPSULE ORAL 2 TIMES DAILY
Qty: 180 CAPSULE | Refills: 3 | Status: SHIPPED | OUTPATIENT
Start: 2022-06-30

## 2022-06-30 RX ORDER — INSULIN DETEMIR 100 [IU]/ML
47.4 INJECTION, SOLUTION SUBCUTANEOUS NIGHTLY
Qty: 6 PEN | Refills: 3 | Status: SHIPPED | OUTPATIENT
Start: 2022-06-30 | End: 2022-07-26 | Stop reason: SDUPTHER

## 2022-06-30 RX ORDER — SITAGLIPTIN AND METFORMIN HYDROCHLORIDE 1000; 50 MG/1; MG/1
2 TABLET, FILM COATED, EXTENDED RELEASE ORAL DAILY
Qty: 180 TABLET | Refills: 3 | Status: SHIPPED | OUTPATIENT
Start: 2022-06-30

## 2022-06-30 NOTE — TELEPHONE ENCOUNTER
Patients wife stopped by the office and would like to know when the patients other medicine is going to be called in for him. Only his Crestor was sent to the pharmacy but none of the other medicines was called in for him. The patient is completely out and his sugar was running around 220 something this morning. The patient is needing the medicines called in for him. Please Advise. [facility] [place].

## 2022-07-04 DIAGNOSIS — F33.0 MAJOR DEPRESSIVE DISORDER, RECURRENT, MILD (HCC): ICD-10-CM

## 2022-07-05 RX ORDER — ESCITALOPRAM OXALATE 10 MG/1
TABLET ORAL
Qty: 90 TABLET | Refills: 3 | Status: SHIPPED | OUTPATIENT
Start: 2022-07-05

## 2022-07-26 ENCOUNTER — OFFICE VISIT (OUTPATIENT)
Dept: FAMILY MEDICINE CLINIC | Facility: CLINIC | Age: 57
End: 2022-07-26
Payer: COMMERCIAL

## 2022-07-26 VITALS
OXYGEN SATURATION: 98 % | DIASTOLIC BLOOD PRESSURE: 70 MMHG | HEART RATE: 72 BPM | SYSTOLIC BLOOD PRESSURE: 138 MMHG | WEIGHT: 209 LBS | BODY MASS INDEX: 30.96 KG/M2 | HEIGHT: 69 IN

## 2022-07-26 DIAGNOSIS — Z79.4 TYPE 2 DIABETES MELLITUS WITH HYPERGLYCEMIA, WITH LONG-TERM CURRENT USE OF INSULIN (HCC): ICD-10-CM

## 2022-07-26 DIAGNOSIS — Z79.899 HIGH RISK MEDICATION USE: ICD-10-CM

## 2022-07-26 DIAGNOSIS — E11.65 TYPE 2 DIABETES MELLITUS WITH HYPERGLYCEMIA, WITH LONG-TERM CURRENT USE OF INSULIN (HCC): ICD-10-CM

## 2022-07-26 DIAGNOSIS — Z12.5 SCREENING FOR PROSTATE CANCER: ICD-10-CM

## 2022-07-26 DIAGNOSIS — E66.09 CLASS 1 OBESITY DUE TO EXCESS CALORIES WITH SERIOUS COMORBIDITY AND BODY MASS INDEX (BMI) OF 30.0 TO 30.9 IN ADULT: ICD-10-CM

## 2022-07-26 DIAGNOSIS — Z91.89 10 YEAR RISK OF MI OR STROKE 7.5% OR GREATER: ICD-10-CM

## 2022-07-26 DIAGNOSIS — Z00.00 WELL ADULT EXAM: Primary | ICD-10-CM

## 2022-07-26 DIAGNOSIS — E78.5 DYSLIPIDEMIA: ICD-10-CM

## 2022-07-26 DIAGNOSIS — F33.0 MILD EPISODE OF RECURRENT MAJOR DEPRESSIVE DISORDER (HCC): ICD-10-CM

## 2022-07-26 DIAGNOSIS — I10 BENIGN ESSENTIAL HTN: ICD-10-CM

## 2022-07-26 LAB
ALBUMIN SERPL-MCNC: 4 G/DL (ref 3.5–5)
ALBUMIN/GLOB SERPL: 1.2 {RATIO} (ref 1.2–3.5)
ALP SERPL-CCNC: 65 U/L (ref 50–136)
ALT SERPL-CCNC: 44 U/L (ref 12–65)
ANION GAP SERPL CALC-SCNC: 7 MMOL/L (ref 7–16)
AST SERPL-CCNC: 19 U/L (ref 15–37)
BILIRUB SERPL-MCNC: 0.3 MG/DL (ref 0.2–1.1)
BUN SERPL-MCNC: 15 MG/DL (ref 6–23)
CALCIUM SERPL-MCNC: 9.6 MG/DL (ref 8.3–10.4)
CHLORIDE SERPL-SCNC: 106 MMOL/L (ref 98–107)
CHOLEST SERPL-MCNC: 213 MG/DL
CO2 SERPL-SCNC: 27 MMOL/L (ref 21–32)
CREAT SERPL-MCNC: 0.9 MG/DL (ref 0.8–1.5)
CREAT UR-MCNC: 52 MG/DL
GLOBULIN SER CALC-MCNC: 3.3 G/DL (ref 2.3–3.5)
GLUCOSE SERPL-MCNC: 154 MG/DL (ref 65–100)
HDLC SERPL-MCNC: 28 MG/DL (ref 40–60)
HDLC SERPL: 7.6 {RATIO}
LDLC SERPL CALC-MCNC: ABNORMAL MG/DL
LDLC SERPL DIRECT ASSAY-MCNC: 131 MG/DL
MICROALBUMIN UR-MCNC: 0.68 MG/DL
MICROALBUMIN/CREAT UR-RTO: 13 MG/G
POTASSIUM SERPL-SCNC: 4.2 MMOL/L (ref 3.5–5.1)
PROT SERPL-MCNC: 7.3 G/DL (ref 6.3–8.2)
PSA SERPL-MCNC: 0.3 NG/ML
SODIUM SERPL-SCNC: 140 MMOL/L (ref 138–145)
TRIGL SERPL-MCNC: 513 MG/DL (ref 35–150)
VLDLC SERPL CALC-MCNC: 102.6 MG/DL (ref 6–23)

## 2022-07-26 PROCEDURE — 99396 PREV VISIT EST AGE 40-64: CPT | Performed by: FAMILY MEDICINE

## 2022-07-26 RX ORDER — INSULIN DETEMIR 100 [IU]/ML
50 INJECTION, SOLUTION SUBCUTANEOUS NIGHTLY
Qty: 45 ML | Refills: 3 | Status: SHIPPED | OUTPATIENT
Start: 2022-07-26

## 2022-07-26 ASSESSMENT — ENCOUNTER SYMPTOMS
NAUSEA: 0
ALLERGIC/IMMUNOLOGIC NEGATIVE: 1
CONSTIPATION: 0
VOMITING: 0
DIARRHEA: 0
SHORTNESS OF BREATH: 0
CHEST TIGHTNESS: 0
COUGH: 0
WHEEZING: 0
ABDOMINAL PAIN: 0
EYES NEGATIVE: 1
BACK PAIN: 0

## 2022-07-26 ASSESSMENT — PATIENT HEALTH QUESTIONNAIRE - PHQ9
SUM OF ALL RESPONSES TO PHQ QUESTIONS 1-9: 0
2. FEELING DOWN, DEPRESSED OR HOPELESS: 0
SUM OF ALL RESPONSES TO PHQ9 QUESTIONS 1 & 2: 0
1. LITTLE INTEREST OR PLEASURE IN DOING THINGS: 0
SUM OF ALL RESPONSES TO PHQ QUESTIONS 1-9: 0

## 2022-07-26 NOTE — PROGRESS NOTES
Units       4.8 - 5.6 %   12/15/2021      4:13 PM 7.2 (H)   9/20/2021      10:18 AM 7.2 (H)   6/18/2021      8:25 AM 7.2 (H)   3/18/2021      9:15 AM 6.7 (H)   11/27/2020      8:33 AM 7.6 (H)   5/28/2020      8:27 AM 6.8 (H)   11/8/2019      8:32 AM 6.1 (H)   8/12/2019      9:57 AM 7.2 (H)     Subjective    HPI: Please see my assessment and plan notes (above) for individual problems addressed today. Other important information: This is a 50-year-old male patient here today for complete physical exam and 6-month follow-up on diabetes as well as other medical problems including hypertension, lipidemia, obesity, CAD. The patient did not have labs in advance of today's visit. Instead we will draw labs today or at a future date. Reviewed and updated this visit by provider:           Review of Systems   Constitutional:  Negative for fatigue and fever. HENT: Negative. Eyes: Negative. Respiratory:  Negative for cough, chest tightness, shortness of breath and wheezing. Cardiovascular:  Negative for chest pain and leg swelling. Gastrointestinal:  Negative for abdominal pain, constipation, diarrhea, nausea and vomiting. Endocrine: Negative. Genitourinary:  Negative for difficulty urinating, dysuria and frequency. Musculoskeletal:  Negative for arthralgias, back pain and neck pain. Skin:  Negative for rash. Allergic/Immunologic: Negative. Neurological:  Negative for dizziness and light-headedness. Psychiatric/Behavioral:  Negative for dysphoric mood. The patient is not nervous/anxious. All other systems reviewed and are negative. Objective    /70 (Site: Left Upper Arm, Position: Sitting, Cuff Size: Medium Adult)   Pulse 72   Ht 5' 9\" (1.753 m)   Wt 209 lb (94.8 kg)   SpO2 98%   BMI 30.86 kg/m²     Physical Exam  Vitals reviewed. Constitutional:       General: He is not in acute distress. Appearance: Normal appearance.    HENT:      Head: Normocephalic and atraumatic. Right Ear: Tympanic membrane, ear canal and external ear normal.      Left Ear: Tympanic membrane, ear canal and external ear normal.      Mouth/Throat:      Mouth: Mucous membranes are moist.   Eyes:      Extraocular Movements: Extraocular movements intact. Conjunctiva/sclera: Conjunctivae normal.      Pupils: Pupils are equal, round, and reactive to light. Neck:      Vascular: No carotid bruit. Cardiovascular:      Rate and Rhythm: Normal rate and regular rhythm. Heart sounds: Normal heart sounds. Pulmonary:      Effort: Pulmonary effort is normal.      Breath sounds: Normal breath sounds. Abdominal:      General: Bowel sounds are normal.      Palpations: Abdomen is soft. Musculoskeletal:      Cervical back: Neck supple. No rigidity or tenderness. Lymphadenopathy:      Cervical: No cervical adenopathy. Skin:     General: Skin is warm and dry. Neurological:      General: No focal deficit present. Mental Status: He is alert and oriented to person, place, and time. Psychiatric:         Mood and Affect: Mood normal.         Behavior: Behavior normal.         Thought Content: Thought content normal.         Judgment: Judgment normal.       On this date 07/26/2022 I have spent 34 minutes reviewing previous notes, lab/imaging results and face to face with the patient discussing the diagnoses and importance of compliance with the treatment plan, as well as documenting on the day of the visit.         Wolfgang Santana DO  [facility]

## 2022-07-26 NOTE — PATIENT INSTRUCTIONS
^^^^^    Low Carbohydrate/Reduced Fat Diet (25/40 Rule)    With insulin resistance (BUT ALSO FOR ANYONE TRYING TO LOSE WEIGHT), carbohydrates become a source of problems as carbohydrates break down with digestion into glucose (sugar) molecules. After years of following patients in the clinic and monitoring thousands of patients home  glucose monitor reports in combination with advanced lipid  evaluation, I have come to the conclusion that carbohydrates ingestion greater than 25 grams in a four hour period leads to the liver manufacturing large quantities of bad fats and bad inflammatory factors for approximately four days. It is vital that every patient eat <25 grams of carbohydrates every four hours strictly and consistently! Bread and fruit are almost deal-breakers when you attempt to stay under 25 grams of carbohydrates every four hours. It seems strange to say this, but it is better to avoid bread and fruit altogether if you have any form of insulin resistance. I suggest that insulin resistant patients avoid pasta and rice as well due to the amount of carbohydrates contained within pasta and rice. It is difficult to stay under  25 grams of carbohydrates when rice and pasta are included in the meal. Some dieticians/clinicians suggest that whole wheat pasta and brown rice are acceptable options due to the complex carbohydrates contained within these foods, but I see problems with this strategy and suggest that you avoid all pasta and all rice intake. Low carbohydrates diets emphasize proteins as proteins are your friend!   To succeed at our low carbohydrate/reduced fat diet you will be eating mostly, meat, vegetables, soup, and salad. Most vegetables are great for this diet with the exception of potatoes, corn, carrots, and beets.   Some of the other vegetables contain a modest amount of carbohydrates but, in my experience, most of the other vegetables are well tolerated when reasonable portions are eaten. Your carbohydrate allowance is strictly <25 grams in a four hour period. If you skip a meal, you cannot eat 50 grams of carbohydrates in the next feeding time period! There is no credit system for your carbohydrate reduced diet! If you miss eating a meal, you simply loose the 25 gram carbohydrate allotment that you could have eaten. If you eat all 25 grams of the carbohydrates that you are allowed at a mealtime, you cannot eat a 12 gram carbohydrate snack in 2 hours. If you want to snack between meals, you have to lessen your carbohydrate intake at mealtimes so that the total carbohydrates for the meal and snack do not exceed 25 grams total in a four hour time period. Regarding fat intake, you want to be careful to avoid saturated fats, often obtained from red meat, eggs, and fried foods. If the meat doesnt swim underwater or fly, it is probably bad for you! You can cook your meats almost anyway imaginable except do not freitas your food! Baking, broiling, grilling, rotisserie, roasting, or microwaving your meats all will be fine.    SO... LIMIT BREAD, FRUIT, PASTA, RICE, POTATOES, AND CORN! Adding >40 minutes of nonstop, aerobic exercise daily to the above low carbohydrate/reduced fat diet will help remove the endothelial inflammatory factors and help you maximize your overall cardiovascular health status! The 25/40 rule is the summation of what every insulin resistant patient needs to have deeply embedded in their mindset! The 25 represents the absolute 4 hour limit of carbohydrate intake while the 40 represents the minimum amount of nonstop, aerobic exercise necessary to obtain removal of endothelial inflammation. Following the 25/40 rule is the key to successful lifestyle changes that facilitate a healthy cardiovascular system! Are you DRINKING any calories? If you are, they are adding weight and not even filling you up.  Cutting those carbs out will cause you to lose weight without even trying!!! Think about SWEET TEA (killing Nahid Asotrga), JUICES (just concentrated fruits without fiber), SODA (I hate to say it but DIET SODA would certainly be better), MILK (God designed it for making young calves into BIG cows fast. You don't need it. Try almond, coconut, rice, soy milk or combinations. But make sure they are UNSWEETENED. BEER and WINE (self explanatory). An \"occasional\" one of these is probably not an issue. But NOT daily. So...DO NOT DRINK:  SWEET TEA, SUGAR SODA, JUICES, MILK, BEER OR WINE! Want more good ideas?   Watch the documentary called:  \"[place]\"on [employer] (or other online streaming platform)

## 2022-07-27 DIAGNOSIS — E11.65 TYPE 2 DIABETES MELLITUS WITH HYPERGLYCEMIA, WITH LONG-TERM CURRENT USE OF INSULIN (HCC): Primary | ICD-10-CM

## 2022-07-27 DIAGNOSIS — Z79.4 TYPE 2 DIABETES MELLITUS WITH HYPERGLYCEMIA, WITH LONG-TERM CURRENT USE OF INSULIN (HCC): Primary | ICD-10-CM

## 2022-07-27 LAB
EST. AVERAGE GLUCOSE BLD GHB EST-MCNC: 183 MG/DL
HBA1C MFR BLD: 8 % (ref 4.8–5.6)

## 2022-08-10 ENCOUNTER — TELEPHONE (OUTPATIENT)
Dept: FAMILY MEDICINE CLINIC | Facility: CLINIC | Age: 57
End: 2022-08-10

## 2022-08-10 NOTE — TELEPHONE ENCOUNTER
Dr Annelise Brown has this request, he has not released to pharm, will ask Dr Annelise Brown to send ASAP

## 2022-08-10 NOTE — TELEPHONE ENCOUNTER
Pt needs insulin pen needles and accu-chek test strips sent to [facility] in [place]. Pt says he needs these ASAP and that he is out.

## 2022-08-11 RX ORDER — GLUCOSAMINE HCL/CHONDROITIN SU 500-400 MG
CAPSULE ORAL
Qty: 100 STRIP | Refills: 3 | Status: SHIPPED | OUTPATIENT
Start: 2022-08-11

## 2022-11-04 DIAGNOSIS — M54.42 LUMBAGO WITH SCIATICA, LEFT SIDE: ICD-10-CM

## 2022-11-04 DIAGNOSIS — E11.29 TYPE 2 DIABETES MELLITUS WITH OTHER DIABETIC KIDNEY COMPLICATION (HCC): ICD-10-CM

## 2022-11-04 DIAGNOSIS — I10 ESSENTIAL (PRIMARY) HYPERTENSION: ICD-10-CM

## 2022-11-07 RX ORDER — REPAGLINIDE 2 MG/1
TABLET ORAL
Qty: 270 TABLET | Refills: 3 | Status: SHIPPED | OUTPATIENT
Start: 2022-11-07

## 2022-11-07 RX ORDER — LISINOPRIL 10 MG/1
TABLET ORAL
Qty: 90 TABLET | Refills: 3 | Status: SHIPPED | OUTPATIENT
Start: 2022-11-07

## 2022-11-07 RX ORDER — GABAPENTIN 300 MG/1
300 CAPSULE ORAL NIGHTLY PRN
Qty: 90 CAPSULE | Refills: 3 | Status: SHIPPED | OUTPATIENT
Start: 2022-11-07 | End: 2023-11-02

## 2023-01-24 ENCOUNTER — NURSE ONLY (OUTPATIENT)
Dept: FAMILY MEDICINE CLINIC | Facility: CLINIC | Age: 58
End: 2023-01-24

## 2023-01-24 DIAGNOSIS — E11.65 TYPE 2 DIABETES MELLITUS WITH HYPERGLYCEMIA, WITH LONG-TERM CURRENT USE OF INSULIN (HCC): ICD-10-CM

## 2023-01-24 DIAGNOSIS — Z79.4 TYPE 2 DIABETES MELLITUS WITH HYPERGLYCEMIA, WITH LONG-TERM CURRENT USE OF INSULIN (HCC): ICD-10-CM

## 2023-01-25 LAB
EST. AVERAGE GLUCOSE BLD GHB EST-MCNC: 128 MG/DL
HBA1C MFR BLD: 6.1 % (ref 4.8–5.6)

## 2023-01-26 ENCOUNTER — OFFICE VISIT (OUTPATIENT)
Dept: FAMILY MEDICINE CLINIC | Facility: CLINIC | Age: 58
End: 2023-01-26
Payer: COMMERCIAL

## 2023-01-26 VITALS
DIASTOLIC BLOOD PRESSURE: 70 MMHG | HEART RATE: 76 BPM | OXYGEN SATURATION: 97 % | BODY MASS INDEX: 29.92 KG/M2 | WEIGHT: 202 LBS | SYSTOLIC BLOOD PRESSURE: 130 MMHG | HEIGHT: 69 IN

## 2023-01-26 DIAGNOSIS — I10 BENIGN ESSENTIAL HTN: Chronic | ICD-10-CM

## 2023-01-26 DIAGNOSIS — Z72.0 TOBACCO ABUSE: Chronic | ICD-10-CM

## 2023-01-26 DIAGNOSIS — Z12.5 SCREENING FOR PROSTATE CANCER: Chronic | ICD-10-CM

## 2023-01-26 DIAGNOSIS — Z79.4 TYPE 2 DIABETES MELLITUS WITH HYPERGLYCEMIA, WITH LONG-TERM CURRENT USE OF INSULIN (HCC): Primary | Chronic | ICD-10-CM

## 2023-01-26 DIAGNOSIS — E78.5 DYSLIPIDEMIA: ICD-10-CM

## 2023-01-26 DIAGNOSIS — Z79.899 HIGH RISK MEDICATION USE: ICD-10-CM

## 2023-01-26 DIAGNOSIS — Z11.4 SCREENING FOR HIV WITHOUT PRESENCE OF RISK FACTORS: ICD-10-CM

## 2023-01-26 DIAGNOSIS — E66.3 OVERWEIGHT (BMI 25.0-29.9): Chronic | ICD-10-CM

## 2023-01-26 DIAGNOSIS — M54.42 CHRONIC LEFT-SIDED LOW BACK PAIN WITH LEFT-SIDED SCIATICA: ICD-10-CM

## 2023-01-26 DIAGNOSIS — E11.65 TYPE 2 DIABETES MELLITUS WITH HYPERGLYCEMIA, WITH LONG-TERM CURRENT USE OF INSULIN (HCC): Primary | Chronic | ICD-10-CM

## 2023-01-26 DIAGNOSIS — F33.0 MILD EPISODE OF RECURRENT MAJOR DEPRESSIVE DISORDER (HCC): Chronic | ICD-10-CM

## 2023-01-26 DIAGNOSIS — G89.29 CHRONIC LEFT-SIDED LOW BACK PAIN WITH LEFT-SIDED SCIATICA: ICD-10-CM

## 2023-01-26 PROBLEM — Z00.00 WELL ADULT EXAM: Chronic | Status: ACTIVE | Noted: 2021-09-28

## 2023-01-26 PROCEDURE — 3075F SYST BP GE 130 - 139MM HG: CPT | Performed by: FAMILY MEDICINE

## 2023-01-26 PROCEDURE — 3044F HG A1C LEVEL LT 7.0%: CPT | Performed by: FAMILY MEDICINE

## 2023-01-26 PROCEDURE — 3078F DIAST BP <80 MM HG: CPT | Performed by: FAMILY MEDICINE

## 2023-01-26 PROCEDURE — 99214 OFFICE O/P EST MOD 30 MIN: CPT | Performed by: FAMILY MEDICINE

## 2023-01-26 RX ORDER — OLMESARTAN MEDOXOMIL 20 MG/1
20 TABLET ORAL DAILY
Qty: 90 TABLET | Refills: 3 | Status: SHIPPED | OUTPATIENT
Start: 2023-01-26

## 2023-01-26 RX ORDER — GABAPENTIN 300 MG/1
300 CAPSULE ORAL 2 TIMES DAILY PRN
Qty: 180 CAPSULE | Refills: 3 | Status: SHIPPED | OUTPATIENT
Start: 2023-01-26 | End: 2024-01-21

## 2023-01-26 ASSESSMENT — PATIENT HEALTH QUESTIONNAIRE - PHQ9
SUM OF ALL RESPONSES TO PHQ QUESTIONS 1-9: 0
SUM OF ALL RESPONSES TO PHQ9 QUESTIONS 1 & 2: 0
SUM OF ALL RESPONSES TO PHQ QUESTIONS 1-9: 0
2. FEELING DOWN, DEPRESSED OR HOPELESS: 0
SUM OF ALL RESPONSES TO PHQ QUESTIONS 1-9: 0
1. LITTLE INTEREST OR PLEASURE IN DOING THINGS: 0
SUM OF ALL RESPONSES TO PHQ QUESTIONS 1-9: 0

## 2023-01-26 ASSESSMENT — ENCOUNTER SYMPTOMS
VOMITING: 0
SHORTNESS OF BREATH: 0
ALLERGIC/IMMUNOLOGIC NEGATIVE: 1
DIARRHEA: 0
EYES NEGATIVE: 1
CHEST TIGHTNESS: 0
COUGH: 0
CONSTIPATION: 0
BACK PAIN: 0
NAUSEA: 0
ABDOMINAL PAIN: 0
WHEEZING: 0

## 2023-01-26 NOTE — PROGRESS NOTES
Abdelrahman Garcia DO                Diplomate of the American [employer] Board of [facility] [place]         [phone]    Eveline Stallings is a 61 y.o. male who was seen on 1/26/2023 for   Chief Complaint   Patient presents with    Cough    Diabetes       Assessment & Plan     Diagnosis Orders   1. Type 2 diabetes mellitus with hyperglycemia, with long-term current use of insulin (HCC)  Hemoglobin A1C    Comprehensive Metabolic Panel    Microalbumin / Creatinine Urine Ratio    Well-controlled with hemoglobin A1c of 6.1. Okay to reduce dose of insulin as indicated based on blood sugars. 2. Benign essential HTN  Comprehensive Metabolic Panel    olmesartan (BENICAR) 20 MG tablet    Well-controlled. 3. Dyslipidemia  Lipid Panel    Comprehensive Metabolic Panel    Recheck lipids next visit      4. Overweight (BMI 25.0-29. 9)      Improved. Low carb diet advised/reviewed. Information given. 5. Mild episode of recurrent major depressive disorder ([address]      Stable with current medication. 6. Tobacco abuse      Quitting smoking strongly encouraged      7. Chronic left-sided low back pain with left-sided sciatica  gabapentin (NEURONTIN) 300 MG capsule    Increase gabapentin to 300 mg twice daily as needed      8. Screening for prostate cancer  PSA Screening      9. Screening for HIV without presence of risk factors  HIV 1/2 Ag/Ab, 4TH Generation,W Rflx Confirm      10. High risk medication use  Comprehensive Metabolic Panel          Follow-up and Dispositions    Return in about 6 months (around 7/26/2023) for CPE RHETT. RECENT LABS/TESTS TO REVIEW and DISCUSS    No results found for this visit on 01/26/23.     PHQ-9  1/26/2023 7/26/2022 6/22/2021   Little interest or pleasure in doing things 0 0 -   Little interest or pleasure in doing things - - 0   Feeling down, depressed, or hopeless 0 0 -   PHQ-2 Score 0 0 -   Total Score PHQ 2 - - 0   PHQ-9 Total Score 0 0 -     Component       Hemoglobin A1C   Latest Ref Rng & Units       4.8 - 5.6 %   1/24/2023      9:05 AM 6.1 (H)   7/26/2022      4:22 PM 8.0 (H)   12/15/2021      4:13 PM 7.2 (H)   9/20/2021      10:18 AM 7.2 (H)   6/18/2021      8:25 AM 7.2 (H)   3/18/2021      9:15 AM 6.7 (H)   11/27/2020      8:33 AM 7.6 (H)   5/28/2020      8:27 AM 6.8 (H)   11/8/2019      8:32 AM 6.1 (H)   8/12/2019      9:57 AM 7.2 (H)     Subjective    HPI:     This is a 58-year-old male patient here today for 6-month follow-up on diabetes. The patient had labs in advance of today's visit, the results of which were discussed with him/her at length. The patient's last hemoglobin A1c in July was 8.0 and is now 6.1. He is congratulated on his excellent improvement. Apparently this has been all on his own. No changes to medication were made. He has been taking his medication regularly says his wife. Patient has been having more sciatica pain. He takes 300 mg of gabapentin at bedtime but would like to take 1 in the morning before work as well. I gave him a new prescription for 180 (a 3-month supply). He says he will not take 1 every day, just sometimes. Patient complains of some right low back pain when he twists in a certain direction. I suggested that he might like to try chiropractic therapy. The patient is encouraged to quit smoking and reminded to get a pneumonia vaccine at his pharmacy. Reviewed and updated this visit by provider:           Review of Systems   Constitutional:  Negative for fatigue and fever. HENT: Negative. Eyes: Negative. Respiratory:  Negative for cough, chest tightness, shortness of breath and wheezing. Cardiovascular:  Negative for chest pain and leg swelling. Gastrointestinal:  Negative for abdominal pain, constipation, diarrhea, nausea and vomiting. Endocrine: Negative.     Genitourinary:  Negative for difficulty urinating, dysuria and frequency. Musculoskeletal:  Negative for arthralgias, back pain and neck pain. Skin:  Negative for rash. Allergic/Immunologic: Negative. Neurological:  Negative for dizziness and light-headedness. Psychiatric/Behavioral:  Negative for dysphoric mood. The patient is not nervous/anxious. All other systems reviewed and are negative. Objective    /70 (Site: Right Lower Arm, Position: Sitting, Cuff Size: Medium Adult)   Pulse 76   Ht 5' 9\" (1.753 m)   Wt 202 lb (91.6 kg)   SpO2 97%   BMI 29.83 kg/m²     Physical Exam  Vitals reviewed. Constitutional:       General: He is not in acute distress. Appearance: Normal appearance. HENT:      Head: Normocephalic and atraumatic. Right Ear: Tympanic membrane, ear canal and external ear normal.      Left Ear: Tympanic membrane, ear canal and external ear normal.      Mouth/Throat:      Mouth: Mucous membranes are moist.   Eyes:      Extraocular Movements: Extraocular movements intact. Conjunctiva/sclera: Conjunctivae normal.      Pupils: Pupils are equal, round, and reactive to light. Neck:      Vascular: No carotid bruit. Cardiovascular:      Rate and Rhythm: Normal rate and regular rhythm. Heart sounds: Normal heart sounds. Pulmonary:      Effort: Pulmonary effort is normal.      Breath sounds: Normal breath sounds. Abdominal:      General: Bowel sounds are normal.      Palpations: Abdomen is soft. Musculoskeletal:      Cervical back: Neck supple. No rigidity or tenderness. Lymphadenopathy:      Cervical: No cervical adenopathy. Skin:     General: Skin is warm and dry. Neurological:      General: No focal deficit present. Mental Status: He is alert and oriented to person, place, and time. Psychiatric:         Mood and Affect: Mood normal.         Behavior: Behavior normal.         Thought Content:  Thought content normal.         Judgment: Judgment normal.       On this date 01/26/2023 I have spent 32 minutes reviewing previous notes, lab/imaging results and face to face with the patient discussing the diagnoses and importance of compliance with the treatment plan, as well as documenting on the day of the visit.         Karina Leung,   [facility] [place]

## 2023-02-17 DIAGNOSIS — E11.65 TYPE 2 DIABETES MELLITUS WITH HYPERGLYCEMIA, WITH LONG-TERM CURRENT USE OF INSULIN (HCC): ICD-10-CM

## 2023-02-17 DIAGNOSIS — Z79.4 TYPE 2 DIABETES MELLITUS WITH HYPERGLYCEMIA, WITH LONG-TERM CURRENT USE OF INSULIN (HCC): ICD-10-CM

## 2023-02-17 RX ORDER — PEN NEEDLE, DIABETIC 31 GX5/16"
NEEDLE, DISPOSABLE MISCELLANEOUS
OUTPATIENT
Start: 2023-02-17

## 2023-02-20 DIAGNOSIS — E11.65 TYPE 2 DIABETES MELLITUS WITH HYPERGLYCEMIA, WITH LONG-TERM CURRENT USE OF INSULIN (HCC): ICD-10-CM

## 2023-02-20 DIAGNOSIS — Z79.4 TYPE 2 DIABETES MELLITUS WITH HYPERGLYCEMIA, WITH LONG-TERM CURRENT USE OF INSULIN (HCC): ICD-10-CM

## 2023-02-25 PROBLEM — Z12.5 SCREENING FOR PROSTATE CANCER: Chronic | Status: RESOLVED | Noted: 2020-06-14 | Resolved: 2023-02-25

## 2023-02-25 PROBLEM — Z00.00 WELL ADULT EXAM: Chronic | Status: RESOLVED | Noted: 2021-09-28 | Resolved: 2023-02-25

## 2023-05-18 RX ORDER — PEN NEEDLE, DIABETIC 31 GX5/16"
NEEDLE, DISPOSABLE MISCELLANEOUS
Qty: 100 EACH | Refills: 3 | Status: SHIPPED | OUTPATIENT
Start: 2023-05-18

## 2023-06-19 RX ORDER — DAPAGLIFLOZIN 10 MG/1
TABLET, FILM COATED ORAL
Qty: 90 TABLET | Refills: 3 | Status: SHIPPED | OUTPATIENT
Start: 2023-06-19

## 2023-06-26 DIAGNOSIS — Z79.899 HIGH RISK MEDICATION USE: ICD-10-CM

## 2023-06-26 DIAGNOSIS — Z79.1 NSAID LONG-TERM USE: Primary | ICD-10-CM

## 2023-06-26 DIAGNOSIS — I25.10 ATHEROSCLEROTIC HEART DISEASE OF NATIVE CORONARY ARTERY WITHOUT ANGINA PECTORIS: ICD-10-CM

## 2023-06-26 RX ORDER — CELECOXIB 200 MG/1
CAPSULE ORAL
Qty: 180 CAPSULE | Refills: 3 | Status: SHIPPED | OUTPATIENT
Start: 2023-06-26

## 2023-06-26 RX ORDER — ROSUVASTATIN CALCIUM 20 MG/1
TABLET, COATED ORAL
Qty: 90 TABLET | Refills: 3 | Status: SHIPPED | OUTPATIENT
Start: 2023-06-26

## 2023-06-26 RX ORDER — SITAGLIPTIN AND METFORMIN HYDROCHLORIDE 1000; 50 MG/1; MG/1
TABLET, FILM COATED, EXTENDED RELEASE ORAL
Qty: 180 TABLET | Refills: 3 | Status: SHIPPED | OUTPATIENT
Start: 2023-06-26

## 2023-07-07 DIAGNOSIS — F33.0 MAJOR DEPRESSIVE DISORDER, RECURRENT, MILD (HCC): ICD-10-CM

## 2023-07-07 RX ORDER — ESCITALOPRAM OXALATE 10 MG/1
TABLET ORAL
Qty: 90 TABLET | Refills: 3 | Status: SHIPPED | OUTPATIENT
Start: 2023-07-07

## 2023-07-14 ENCOUNTER — NURSE ONLY (OUTPATIENT)
Dept: FAMILY MEDICINE CLINIC | Facility: CLINIC | Age: 58
End: 2023-07-14

## 2023-07-14 DIAGNOSIS — Z79.4 TYPE 2 DIABETES MELLITUS WITH HYPERGLYCEMIA, WITH LONG-TERM CURRENT USE OF INSULIN (HCC): Chronic | ICD-10-CM

## 2023-07-14 DIAGNOSIS — Z12.5 SCREENING FOR PROSTATE CANCER: Chronic | ICD-10-CM

## 2023-07-14 DIAGNOSIS — E78.5 DYSLIPIDEMIA: ICD-10-CM

## 2023-07-14 DIAGNOSIS — I10 BENIGN ESSENTIAL HTN: Chronic | ICD-10-CM

## 2023-07-14 DIAGNOSIS — E11.65 TYPE 2 DIABETES MELLITUS WITH HYPERGLYCEMIA, WITH LONG-TERM CURRENT USE OF INSULIN (HCC): Chronic | ICD-10-CM

## 2023-07-14 DIAGNOSIS — Z11.4 SCREENING FOR HIV WITHOUT PRESENCE OF RISK FACTORS: ICD-10-CM

## 2023-07-14 DIAGNOSIS — Z79.899 HIGH RISK MEDICATION USE: ICD-10-CM

## 2023-07-14 DIAGNOSIS — Z79.1 NSAID LONG-TERM USE: ICD-10-CM

## 2023-07-14 LAB
ALBUMIN SERPL-MCNC: 4.3 G/DL (ref 3.5–5)
ALBUMIN/GLOB SERPL: 1.2 (ref 0.4–1.6)
ALP SERPL-CCNC: 76 U/L (ref 50–136)
ALT SERPL-CCNC: 32 U/L (ref 12–65)
ANION GAP SERPL CALC-SCNC: 5 MMOL/L (ref 2–11)
AST SERPL-CCNC: 21 U/L (ref 15–37)
BASOPHILS # BLD: 0 K/UL (ref 0–0.2)
BASOPHILS NFR BLD: 0 % (ref 0–2)
BILIRUB SERPL-MCNC: 0.5 MG/DL (ref 0.2–1.1)
BUN SERPL-MCNC: 16 MG/DL (ref 6–23)
CALCIUM SERPL-MCNC: 10.2 MG/DL (ref 8.3–10.4)
CHLORIDE SERPL-SCNC: 112 MMOL/L (ref 101–110)
CHOLEST SERPL-MCNC: 199 MG/DL
CO2 SERPL-SCNC: 23 MMOL/L (ref 21–32)
CREAT SERPL-MCNC: 0.9 MG/DL (ref 0.8–1.5)
CREAT UR-MCNC: 81 MG/DL
DIFFERENTIAL METHOD BLD: ABNORMAL
EOSINOPHIL # BLD: 0.2 K/UL (ref 0–0.8)
EOSINOPHIL NFR BLD: 2 % (ref 0.5–7.8)
ERYTHROCYTE [DISTWIDTH] IN BLOOD BY AUTOMATED COUNT: 13.2 % (ref 11.9–14.6)
EST. AVERAGE GLUCOSE BLD GHB EST-MCNC: 174 MG/DL
GLOBULIN SER CALC-MCNC: 3.5 G/DL (ref 2.8–4.5)
GLUCOSE SERPL-MCNC: 126 MG/DL (ref 65–100)
HBA1C MFR BLD: 7.7 % (ref 4.8–5.6)
HCT VFR BLD AUTO: 49.4 % (ref 41.1–50.3)
HDLC SERPL-MCNC: 40 MG/DL (ref 40–60)
HDLC SERPL: 5
HGB BLD-MCNC: 16.7 G/DL (ref 13.6–17.2)
HIV 1+2 AB+HIV1 P24 AG SERPL QL IA: NONREACTIVE
HIV 1/2 RESULT COMMENT: NORMAL
IMM GRANULOCYTES # BLD AUTO: 0.1 K/UL (ref 0–0.5)
IMM GRANULOCYTES NFR BLD AUTO: 1 % (ref 0–5)
LDLC SERPL CALC-MCNC: 110 MG/DL
LYMPHOCYTES # BLD: 3.1 K/UL (ref 0.5–4.6)
LYMPHOCYTES NFR BLD: 31 % (ref 13–44)
MCH RBC QN AUTO: 31.9 PG (ref 26.1–32.9)
MCHC RBC AUTO-ENTMCNC: 33.8 G/DL (ref 31.4–35)
MCV RBC AUTO: 94.3 FL (ref 82–102)
MICROALBUMIN UR-MCNC: 1.61 MG/DL
MICROALBUMIN/CREAT UR-RTO: 20 MG/G (ref 0–30)
MONOCYTES # BLD: 0.7 K/UL (ref 0.1–1.3)
MONOCYTES NFR BLD: 7 % (ref 4–12)
NEUTS SEG # BLD: 6 K/UL (ref 1.7–8.2)
NEUTS SEG NFR BLD: 59 % (ref 43–78)
NRBC # BLD: 0 K/UL (ref 0–0.2)
PLATELET # BLD AUTO: 240 K/UL (ref 150–450)
PMV BLD AUTO: 8.9 FL (ref 9.4–12.3)
POTASSIUM SERPL-SCNC: 4.4 MMOL/L (ref 3.5–5.1)
PROT SERPL-MCNC: 7.8 G/DL (ref 6.3–8.2)
PSA SERPL-MCNC: 0.4 NG/ML
RBC # BLD AUTO: 5.24 M/UL (ref 4.23–5.6)
SODIUM SERPL-SCNC: 140 MMOL/L (ref 133–143)
TRIGL SERPL-MCNC: 245 MG/DL (ref 35–150)
VLDLC SERPL CALC-MCNC: 49 MG/DL (ref 6–23)
WBC # BLD AUTO: 10.1 K/UL (ref 4.3–11.1)

## 2023-07-27 ENCOUNTER — OFFICE VISIT (OUTPATIENT)
Dept: FAMILY MEDICINE CLINIC | Facility: CLINIC | Age: 58
End: 2023-07-27
Payer: COMMERCIAL

## 2023-07-27 VITALS
WEIGHT: 205 LBS | SYSTOLIC BLOOD PRESSURE: 128 MMHG | HEART RATE: 99 BPM | HEIGHT: 69 IN | DIASTOLIC BLOOD PRESSURE: 62 MMHG | BODY MASS INDEX: 30.36 KG/M2 | OXYGEN SATURATION: 96 %

## 2023-07-27 DIAGNOSIS — E11.65 TYPE 2 DIABETES MELLITUS WITH HYPERGLYCEMIA, WITH LONG-TERM CURRENT USE OF INSULIN (HCC): Chronic | ICD-10-CM

## 2023-07-27 DIAGNOSIS — E78.1 HYPERTRIGLYCERIDEMIA: ICD-10-CM

## 2023-07-27 DIAGNOSIS — Z13.5 SCREENING FOR DIABETIC RETINOPATHY: ICD-10-CM

## 2023-07-27 DIAGNOSIS — Z79.4 TYPE 2 DIABETES MELLITUS WITH HYPERGLYCEMIA, WITH LONG-TERM CURRENT USE OF INSULIN (HCC): Chronic | ICD-10-CM

## 2023-07-27 DIAGNOSIS — I10 BENIGN ESSENTIAL HTN: Chronic | ICD-10-CM

## 2023-07-27 DIAGNOSIS — Z23 NEED FOR PNEUMOCOCCAL VACCINATION: ICD-10-CM

## 2023-07-27 DIAGNOSIS — E66.09 CLASS 1 OBESITY DUE TO EXCESS CALORIES WITH SERIOUS COMORBIDITY AND BODY MASS INDEX (BMI) OF 30.0 TO 30.9 IN ADULT: ICD-10-CM

## 2023-07-27 DIAGNOSIS — Z79.899 HIGH RISK MEDICATION USE: Chronic | ICD-10-CM

## 2023-07-27 DIAGNOSIS — Z23 NEED FOR HEPATITIS B VACCINATION: ICD-10-CM

## 2023-07-27 DIAGNOSIS — Z72.0 TOBACCO ABUSE: Chronic | ICD-10-CM

## 2023-07-27 DIAGNOSIS — Z00.00 WELL ADULT EXAM: Primary | ICD-10-CM

## 2023-07-27 DIAGNOSIS — E78.5 DYSLIPIDEMIA: ICD-10-CM

## 2023-07-27 DIAGNOSIS — F33.0 MILD EPISODE OF RECURRENT MAJOR DEPRESSIVE DISORDER (HCC): Chronic | ICD-10-CM

## 2023-07-27 PROBLEM — E66.811 CLASS 1 OBESITY DUE TO EXCESS CALORIES WITH SERIOUS COMORBIDITY AND BODY MASS INDEX (BMI) OF 30.0 TO 30.9 IN ADULT: Status: ACTIVE | Noted: 2023-07-27

## 2023-07-27 PROCEDURE — 3078F DIAST BP <80 MM HG: CPT | Performed by: FAMILY MEDICINE

## 2023-07-27 PROCEDURE — 90677 PCV20 VACCINE IM: CPT | Performed by: FAMILY MEDICINE

## 2023-07-27 PROCEDURE — 3074F SYST BP LT 130 MM HG: CPT | Performed by: FAMILY MEDICINE

## 2023-07-27 PROCEDURE — 99214 OFFICE O/P EST MOD 30 MIN: CPT | Performed by: FAMILY MEDICINE

## 2023-07-27 PROCEDURE — 3051F HG A1C>EQUAL 7.0%<8.0%: CPT | Performed by: FAMILY MEDICINE

## 2023-07-27 PROCEDURE — 90471 IMMUNIZATION ADMIN: CPT | Performed by: FAMILY MEDICINE

## 2023-07-27 ASSESSMENT — ENCOUNTER SYMPTOMS
CONSTIPATION: 0
EYES NEGATIVE: 1
SHORTNESS OF BREATH: 0
COUGH: 0
BACK PAIN: 0
NAUSEA: 0
DIARRHEA: 0
ABDOMINAL PAIN: 0
CHEST TIGHTNESS: 0
WHEEZING: 0
ALLERGIC/IMMUNOLOGIC NEGATIVE: 1
VOMITING: 0

## 2023-07-27 NOTE — PROGRESS NOTES
Elizabeth Mak DO                Diplomate of the American [employer] Board of [facility] [place]         [phone]    Mary Martinez is a 60 y.o. male who was seen on 7/27/2023 for   Chief Complaint   Patient presents with    Diabetes       Assessment & Plan     Diagnosis Orders   1. Well adult exam        2. Type 2 diabetes mellitus with hyperglycemia, with long-term current use of insulin (HCC)  Hemoglobin A1C    Weight loss encouraged! DIET CHANGES      3. Benign essential HTN      Well-controlled      4. Dyslipidemia      LDL remains elevated despite near maximum dose statin. Low-fat diet encouraged. HDL is significantly improved at 40 (28)      5. Hypertriglyceridemia      Lifestyle changes advised      6. Class 1 obesity due to excess calories with serious comorbidity and body mass index (BMI) of 30.0 to 30.9 in adult      Worsening. Low carb diet advised/reviewed. Information given. 7. Mild episode of recurrent major depressive disorder (HCC)      Stable on current medication      8. Tobacco abuse      Quitting smoking strongly encouraged      9. Screening for diabetic retinopathy        10. High risk medication use        11. Need for pneumococcal vaccination  Pneumococcal, PCV20, PREVNAR 21, (age 25 yrs+), IM, PF    Given today! 12. Need for hepatitis B vaccination            Follow-up and Dispositions    Return in about 3 months (around 10/27/2023) for ROUTINE ELIZABETH. On this date 07/27/2023 I have spent 33 minutes reviewing previous notes, lab/imaging results and face to face with the patient discussing the diagnoses and importance of compliance with the treatment plan, as well as documenting on the day of the visit. RECENT LABS/TESTS TO REVIEW and DISCUSS    No results found for this visit on 07/27/23.     Component       Hemoglobin A1C   Latest Ref Rng & Units       4.8 - 5.6 %   7/14/2023      11:33 AM

## 2023-08-24 DIAGNOSIS — E11.65 TYPE 2 DIABETES MELLITUS WITH HYPERGLYCEMIA, WITH LONG-TERM CURRENT USE OF INSULIN (HCC): ICD-10-CM

## 2023-08-24 DIAGNOSIS — Z79.4 TYPE 2 DIABETES MELLITUS WITH HYPERGLYCEMIA, WITH LONG-TERM CURRENT USE OF INSULIN (HCC): ICD-10-CM

## 2023-08-24 RX ORDER — INSULIN DETEMIR 100 [IU]/ML
INJECTION, SOLUTION SUBCUTANEOUS
Qty: 45 ML | Refills: 3 | Status: SHIPPED | OUTPATIENT
Start: 2023-08-24

## 2023-08-24 RX ORDER — BLOOD SUGAR DIAGNOSTIC
STRIP MISCELLANEOUS
Qty: 100 STRIP | Refills: 3 | Status: SHIPPED | OUTPATIENT
Start: 2023-08-24

## 2023-09-26 ENCOUNTER — APPOINTMENT (OUTPATIENT)
Dept: ULTRASOUND IMAGING | Age: 58
End: 2023-09-26
Payer: COMMERCIAL

## 2023-09-26 ENCOUNTER — HOSPITAL ENCOUNTER (EMERGENCY)
Age: 58
Discharge: HOME | End: 2023-09-26
Attending: EMERGENCY MEDICINE
Payer: COMMERCIAL

## 2023-09-26 ENCOUNTER — APPOINTMENT (OUTPATIENT)
Dept: CT IMAGING | Age: 58
End: 2023-09-26
Payer: COMMERCIAL

## 2023-09-26 VITALS
SYSTOLIC BLOOD PRESSURE: 133 MMHG | DIASTOLIC BLOOD PRESSURE: 81 MMHG | HEIGHT: 69 IN | BODY MASS INDEX: 31.84 KG/M2 | WEIGHT: 215 LBS | TEMPERATURE: 98.3 F | RESPIRATION RATE: 18 BRPM | OXYGEN SATURATION: 95 % | HEART RATE: 90 BPM

## 2023-09-26 DIAGNOSIS — R10.9 RIGHT FLANK PAIN: Primary | ICD-10-CM

## 2023-09-26 LAB
ALBUMIN SERPL-MCNC: 3.6 G/DL (ref 3.5–5)
ALBUMIN/GLOB SERPL: 1 (ref 0.4–1.6)
ALP SERPL-CCNC: 71 U/L (ref 50–136)
ALT SERPL-CCNC: 35 U/L (ref 12–65)
ANION GAP SERPL CALC-SCNC: 7 MMOL/L (ref 2–11)
AST SERPL-CCNC: 18 U/L (ref 15–37)
BASOPHILS # BLD: 0 K/UL (ref 0–0.2)
BASOPHILS NFR BLD: 0 % (ref 0–2)
BILIRUB DIRECT SERPL-MCNC: <0.1 MG/DL
BILIRUB SERPL-MCNC: 0.4 MG/DL (ref 0.2–1.1)
BUN SERPL-MCNC: 19 MG/DL (ref 6–23)
CALCIUM SERPL-MCNC: 9.2 MG/DL (ref 8.3–10.4)
CHLORIDE SERPL-SCNC: 107 MMOL/L (ref 101–110)
CO2 SERPL-SCNC: 25 MMOL/L (ref 21–32)
CREAT SERPL-MCNC: 1.3 MG/DL (ref 0.8–1.5)
DIFFERENTIAL METHOD BLD: ABNORMAL
EOSINOPHIL # BLD: 0.1 K/UL (ref 0–0.8)
EOSINOPHIL NFR BLD: 1 % (ref 0.5–7.8)
ERYTHROCYTE [DISTWIDTH] IN BLOOD BY AUTOMATED COUNT: 12.9 % (ref 11.9–14.6)
GLOBULIN SER CALC-MCNC: 3.7 G/DL (ref 2.8–4.5)
GLUCOSE SERPL-MCNC: 321 MG/DL (ref 65–100)
HCT VFR BLD AUTO: 45.9 % (ref 41.1–50.3)
HGB BLD-MCNC: 16 G/DL (ref 13.6–17.2)
IMM GRANULOCYTES # BLD AUTO: 0.1 K/UL (ref 0–0.5)
IMM GRANULOCYTES NFR BLD AUTO: 1 % (ref 0–5)
LYMPHOCYTES # BLD: 2.7 K/UL (ref 0.5–4.6)
LYMPHOCYTES NFR BLD: 25 % (ref 13–44)
MCH RBC QN AUTO: 32.1 PG (ref 26.1–32.9)
MCHC RBC AUTO-ENTMCNC: 34.9 G/DL (ref 31.4–35)
MCV RBC AUTO: 92 FL (ref 82–102)
MONOCYTES # BLD: 0.7 K/UL (ref 0.1–1.3)
MONOCYTES NFR BLD: 6 % (ref 4–12)
NEUTS SEG # BLD: 7 K/UL (ref 1.7–8.2)
NEUTS SEG NFR BLD: 67 % (ref 43–78)
NRBC # BLD: 0 K/UL (ref 0–0.2)
PLATELET # BLD AUTO: 241 K/UL (ref 150–450)
PMV BLD AUTO: 9 FL (ref 9.4–12.3)
POTASSIUM SERPL-SCNC: 4.3 MMOL/L (ref 3.5–5.1)
PROT SERPL-MCNC: 7.3 G/DL (ref 6.3–8.2)
RBC # BLD AUTO: 4.99 M/UL (ref 4.23–5.6)
SODIUM SERPL-SCNC: 139 MMOL/L (ref 133–143)
WBC # BLD AUTO: 10.6 K/UL (ref 4.3–11.1)

## 2023-09-26 PROCEDURE — 80076 HEPATIC FUNCTION PANEL: CPT

## 2023-09-26 PROCEDURE — 85025 COMPLETE CBC W/AUTO DIFF WBC: CPT

## 2023-09-26 PROCEDURE — 99284 EMERGENCY DEPT VISIT MOD MDM: CPT

## 2023-09-26 PROCEDURE — 76705 ECHO EXAM OF ABDOMEN: CPT

## 2023-09-26 PROCEDURE — 74176 CT ABD & PELVIS W/O CONTRAST: CPT

## 2023-09-26 PROCEDURE — 96374 THER/PROPH/DIAG INJ IV PUSH: CPT

## 2023-09-26 PROCEDURE — 6360000002 HC RX W HCPCS: Performed by: PHYSICIAN ASSISTANT

## 2023-09-26 PROCEDURE — 80048 BASIC METABOLIC PNL TOTAL CA: CPT

## 2023-09-26 RX ORDER — KETOROLAC TROMETHAMINE 15 MG/ML
15 INJECTION, SOLUTION INTRAMUSCULAR; INTRAVENOUS
Status: COMPLETED | OUTPATIENT
Start: 2023-09-26 | End: 2023-09-26

## 2023-09-26 RX ORDER — METHOCARBAMOL 500 MG/1
500 TABLET, FILM COATED ORAL 4 TIMES DAILY
Qty: 28 TABLET | Refills: 0 | Status: SHIPPED | OUTPATIENT
Start: 2023-09-26 | End: 2023-10-03

## 2023-09-26 RX ORDER — NAPROXEN 500 MG/1
500 TABLET ORAL 2 TIMES DAILY WITH MEALS
Qty: 14 TABLET | Refills: 0 | Status: SHIPPED | OUTPATIENT
Start: 2023-09-26 | End: 2023-10-03

## 2023-09-26 RX ADMIN — KETOROLAC TROMETHAMINE 15 MG: 15 INJECTION, SOLUTION INTRAMUSCULAR; INTRAVENOUS at 14:39

## 2023-09-26 ASSESSMENT — PAIN - FUNCTIONAL ASSESSMENT: PAIN_FUNCTIONAL_ASSESSMENT: 0-10

## 2023-09-26 ASSESSMENT — PAIN SCALES - GENERAL: PAINLEVEL_OUTOF10: 7

## 2023-09-26 NOTE — ED TRIAGE NOTES
Since yesterday having right flank pain. Also has right upper abd tenderness. Denies injury/trauma. Denies urinary s/s.

## 2023-09-26 NOTE — DISCHARGE INSTRUCTIONS
As discussed your labs and imaging were reassuring today. I suspect symptoms are likely due to muscle strain or spasm. Take medications as prescribed. Follow-up with your PCP and return to the ER for any new or worsening symptoms.

## 2023-09-26 NOTE — ED PROVIDER NOTES
Final Result   Hepatic steatosis. Hepatomegaly. There appears to be a dual collecting system of the right kidney. The pancreas was obscured by bowel gas. Unremarkable examination of the gallbladder. CT ABDOMEN PELVIS RENAL STONE   Final Result   No renal ureteral or bladder lithiasis on either side. There is no   hydronephrosis. There is a grossly normal-appearing appendix in the right lower quadrant. Hepatic steatosis. Subtle hyperdensity is noted in the right lobe adjacent to the gallbladder fossa   which may represent edema. Voice dictation software was used during the making of this note. This software is not perfect and grammatical and other typographical errors may be present. This note has not been completely proofread for errors.       Lavinia Forrest  09/26/23 9911

## 2023-10-23 ENCOUNTER — NURSE ONLY (OUTPATIENT)
Dept: FAMILY MEDICINE CLINIC | Facility: CLINIC | Age: 58
End: 2023-10-23

## 2023-10-23 DIAGNOSIS — E11.65 TYPE 2 DIABETES MELLITUS WITH HYPERGLYCEMIA, WITH LONG-TERM CURRENT USE OF INSULIN (HCC): Chronic | ICD-10-CM

## 2023-10-23 DIAGNOSIS — Z79.4 TYPE 2 DIABETES MELLITUS WITH HYPERGLYCEMIA, WITH LONG-TERM CURRENT USE OF INSULIN (HCC): Chronic | ICD-10-CM

## 2023-10-24 LAB
EST. AVERAGE GLUCOSE BLD GHB EST-MCNC: 177 MG/DL
HBA1C MFR BLD: 7.8 % (ref 4.8–5.6)

## 2023-10-26 ENCOUNTER — OFFICE VISIT (OUTPATIENT)
Dept: FAMILY MEDICINE CLINIC | Facility: CLINIC | Age: 58
End: 2023-10-26
Payer: COMMERCIAL

## 2023-10-26 VITALS
WEIGHT: 204 LBS | DIASTOLIC BLOOD PRESSURE: 62 MMHG | SYSTOLIC BLOOD PRESSURE: 126 MMHG | HEART RATE: 70 BPM | BODY MASS INDEX: 30.21 KG/M2 | HEIGHT: 69 IN | OXYGEN SATURATION: 96 %

## 2023-10-26 DIAGNOSIS — I10 BENIGN ESSENTIAL HTN: Chronic | ICD-10-CM

## 2023-10-26 DIAGNOSIS — E66.09 CLASS 1 OBESITY DUE TO EXCESS CALORIES WITH SERIOUS COMORBIDITY AND BODY MASS INDEX (BMI) OF 30.0 TO 30.9 IN ADULT: Chronic | ICD-10-CM

## 2023-10-26 DIAGNOSIS — Z23 NEED FOR IMMUNIZATION AGAINST INFLUENZA: ICD-10-CM

## 2023-10-26 DIAGNOSIS — Z13.5 SCREENING FOR DIABETIC RETINOPATHY: ICD-10-CM

## 2023-10-26 DIAGNOSIS — Z79.4 TYPE 2 DIABETES MELLITUS WITH HYPERGLYCEMIA, WITH LONG-TERM CURRENT USE OF INSULIN (HCC): Primary | Chronic | ICD-10-CM

## 2023-10-26 DIAGNOSIS — E11.65 TYPE 2 DIABETES MELLITUS WITH HYPERGLYCEMIA, WITH LONG-TERM CURRENT USE OF INSULIN (HCC): Primary | Chronic | ICD-10-CM

## 2023-10-26 PROCEDURE — 99214 OFFICE O/P EST MOD 30 MIN: CPT | Performed by: FAMILY MEDICINE

## 2023-10-26 PROCEDURE — 3078F DIAST BP <80 MM HG: CPT | Performed by: FAMILY MEDICINE

## 2023-10-26 PROCEDURE — 3074F SYST BP LT 130 MM HG: CPT | Performed by: FAMILY MEDICINE

## 2023-10-26 PROCEDURE — 90674 CCIIV4 VAC NO PRSV 0.5 ML IM: CPT | Performed by: FAMILY MEDICINE

## 2023-10-26 PROCEDURE — 90471 IMMUNIZATION ADMIN: CPT | Performed by: FAMILY MEDICINE

## 2023-10-26 PROCEDURE — 3051F HG A1C>EQUAL 7.0%<8.0%: CPT | Performed by: FAMILY MEDICINE

## 2023-10-26 RX ORDER — METFORMIN HYDROCHLORIDE 500 MG/1
1500 TABLET, EXTENDED RELEASE ORAL
Qty: 270 TABLET | Refills: 3 | Status: SHIPPED | OUTPATIENT
Start: 2023-10-26

## 2023-10-26 RX ORDER — ORAL SEMAGLUTIDE 14 MG/1
14 TABLET ORAL DAILY
Qty: 30 TABLET | Refills: 1 | Status: SHIPPED | OUTPATIENT
Start: 2023-11-25

## 2023-10-26 RX ORDER — ORAL SEMAGLUTIDE 7 MG/1
7 TABLET ORAL DAILY
Qty: 30 TABLET | Refills: 0 | Status: SHIPPED | OUTPATIENT
Start: 2023-10-26 | End: 2023-11-25

## 2023-10-26 ASSESSMENT — ENCOUNTER SYMPTOMS
CONSTIPATION: 0
VOMITING: 0
WHEEZING: 0
EYES NEGATIVE: 1
DIARRHEA: 0
BACK PAIN: 0
NAUSEA: 0
ALLERGIC/IMMUNOLOGIC NEGATIVE: 1
COUGH: 0
CHEST TIGHTNESS: 0
ABDOMINAL PAIN: 0
SHORTNESS OF BREATH: 0

## 2023-10-26 NOTE — PROGRESS NOTES
Sherita Diaz DO                Diplomate of the [employer] Board of [facility] [place]         [phone]    Conrado Peck is a 60 y.o. male who was seen on 10/26/2023 for   Chief Complaint   Patient presents with    Diabetes    Other     Requesting flu vaccine       Assessment & Plan     Diagnosis Orders   1. Type 2 diabetes mellitus with hyperglycemia, with long-term current use of insulin (HCC)  Semaglutide (RYBELSUS) 7 MG TABS    metFORMIN (GLUCOPHAGE-XR) 500 MG extended release tablet    Semaglutide (RYBELSUS) 14 MG TABS    Hemoglobin A1C    Discontinue Janumet and repaglinide. Start Rybelsus 7 mg advancing to 14 mg after the first month. Change to metformin 1500 mg daily and taper basal insulin      2. Benign essential HTN      Well-controlled      3. Class 1 obesity due to excess calories with serious comorbidity and body mass index (BMI) of 30.0 to 30.9 in adult      Worsening. Low carb diet advised/reviewed. Information given. 4. Need for immunization against influenza  Influenza, FLUCELVAX, (age 10 mo+), IM, Preservative Free, 0.5 mL      5. Screening for diabetic retinopathy            Follow-up and Dispositions    Return in about 3 months (around 1/26/2024) for ROUTINE RHETT. On this date 10/26/2023 I have spent 36 minutes reviewing previous notes, lab/imaging results and face to face with the patient discussing the diagnoses and importance of compliance with the treatment plan, as well as documenting on the day of the visit. RECENT LABS/TESTS TO REVIEW and DISCUSS    No results found for this visit on 10/26/23. Component      Latest Ref Rng 12/15/2021 7/26/2022 1/24/2023 7/14/2023 10/23/2023   Hemoglobin A1C      4.8 - 5.6 % 7.2 (H)  8.0 (H)  6.1 (H)  7.7 (H)  7.8 (H)       Subjective    HPI:     This is a 76-year-old male patient here today for 3-month follow-up on diabetes.   The patient had

## 2023-11-08 PROBLEM — E66.811 CLASS 1 OBESITY DUE TO EXCESS CALORIES WITH SERIOUS COMORBIDITY AND BODY MASS INDEX (BMI) OF 30.0 TO 30.9 IN ADULT: Chronic | Status: ACTIVE | Noted: 2023-07-27

## 2023-11-08 PROBLEM — Z92.29 COVID-19 VACCINE SERIES COMPLETED: Status: RESOLVED | Noted: 2022-01-02 | Resolved: 2023-11-08

## 2023-11-08 PROBLEM — E66.09 CLASS 1 OBESITY DUE TO EXCESS CALORIES WITH SERIOUS COMORBIDITY AND BODY MASS INDEX (BMI) OF 30.0 TO 30.9 IN ADULT: Chronic | Status: ACTIVE | Noted: 2023-07-27

## 2024-01-25 DIAGNOSIS — Z79.4 TYPE 2 DIABETES MELLITUS WITH HYPERGLYCEMIA, WITH LONG-TERM CURRENT USE OF INSULIN (HCC): Chronic | ICD-10-CM

## 2024-01-25 DIAGNOSIS — E11.65 TYPE 2 DIABETES MELLITUS WITH HYPERGLYCEMIA, WITH LONG-TERM CURRENT USE OF INSULIN (HCC): Chronic | ICD-10-CM

## 2024-01-25 RX ORDER — ORAL SEMAGLUTIDE 14 MG/1
14 TABLET ORAL DAILY
Qty: 30 TABLET | Refills: 2 | Status: SHIPPED | OUTPATIENT
Start: 2024-01-25

## 2024-01-25 NOTE — TELEPHONE ENCOUNTER
New patient appointment with Dr Reed 4/3/24  Last seen by Dr Stevens 10/26/23  Last RX 14 mg # 30 RF X 1 11/25/23

## 2024-02-20 DIAGNOSIS — I10 BENIGN ESSENTIAL HTN: Chronic | ICD-10-CM

## 2024-02-21 RX ORDER — OLMESARTAN MEDOXOMIL 20 MG/1
20 TABLET ORAL DAILY
Qty: 30 TABLET | Refills: 2 | Status: SHIPPED | OUTPATIENT
Start: 2024-02-21

## 2024-03-25 ENCOUNTER — TELEPHONE (OUTPATIENT)
Dept: FAMILY MEDICINE CLINIC | Facility: CLINIC | Age: 59
End: 2024-03-25

## 2024-03-25 NOTE — TELEPHONE ENCOUNTER
Per message from PA; \"Prior Authorization not required for patient/medication.\"      Also submit PA through CoverMyMeds.      There was an error with your request  Patient Inactive        Patients wife contacted; Will contact insurance company next week when they return from vacation.

## 2024-03-25 NOTE — TELEPHONE ENCOUNTER
Per fax from pharmacy; Levemir is not covered under insurance. No alternative given. Will attempt to complete PA through [employer]. Awaiting response.

## 2024-04-03 ENCOUNTER — OFFICE VISIT (OUTPATIENT)
Dept: FAMILY MEDICINE CLINIC | Facility: CLINIC | Age: 59
End: 2024-04-03
Payer: COMMERCIAL

## 2024-04-03 VITALS
DIASTOLIC BLOOD PRESSURE: 72 MMHG | OXYGEN SATURATION: 96 % | WEIGHT: 194.8 LBS | BODY MASS INDEX: 28.85 KG/M2 | HEART RATE: 75 BPM | HEIGHT: 69 IN | SYSTOLIC BLOOD PRESSURE: 116 MMHG

## 2024-04-03 DIAGNOSIS — Z79.4 TYPE 2 DIABETES MELLITUS WITH HYPERGLYCEMIA, WITH LONG-TERM CURRENT USE OF INSULIN (HCC): Primary | ICD-10-CM

## 2024-04-03 DIAGNOSIS — I10 BENIGN ESSENTIAL HTN: Chronic | ICD-10-CM

## 2024-04-03 DIAGNOSIS — L84 CALLUS BETWEEN TOES: ICD-10-CM

## 2024-04-03 DIAGNOSIS — F33.0 MAJOR DEPRESSIVE DISORDER, RECURRENT, MILD (HCC): ICD-10-CM

## 2024-04-03 DIAGNOSIS — E78.00 HYPERCHOLESTEREMIA: ICD-10-CM

## 2024-04-03 DIAGNOSIS — M54.42 CHRONIC LEFT-SIDED LOW BACK PAIN WITH LEFT-SIDED SCIATICA: ICD-10-CM

## 2024-04-03 DIAGNOSIS — G89.29 CHRONIC LEFT-SIDED LOW BACK PAIN WITH LEFT-SIDED SCIATICA: ICD-10-CM

## 2024-04-03 DIAGNOSIS — Z12.5 SCREENING FOR PROSTATE CANCER: ICD-10-CM

## 2024-04-03 DIAGNOSIS — E11.65 TYPE 2 DIABETES MELLITUS WITH HYPERGLYCEMIA, WITH LONG-TERM CURRENT USE OF INSULIN (HCC): Primary | ICD-10-CM

## 2024-04-03 PROCEDURE — 3078F DIAST BP <80 MM HG: CPT | Performed by: FAMILY MEDICINE

## 2024-04-03 PROCEDURE — 99214 OFFICE O/P EST MOD 30 MIN: CPT | Performed by: FAMILY MEDICINE

## 2024-04-03 PROCEDURE — 3074F SYST BP LT 130 MM HG: CPT | Performed by: FAMILY MEDICINE

## 2024-04-03 RX ORDER — OLMESARTAN MEDOXOMIL 20 MG/1
20 TABLET ORAL DAILY
Qty: 90 TABLET | Refills: 3 | Status: SHIPPED | OUTPATIENT
Start: 2024-04-03 | End: 2025-04-03

## 2024-04-03 RX ORDER — ROSUVASTATIN CALCIUM 20 MG/1
20 TABLET, COATED ORAL NIGHTLY
Qty: 90 TABLET | Refills: 3 | Status: SHIPPED | OUTPATIENT
Start: 2024-04-03 | End: 2025-04-03

## 2024-04-03 RX ORDER — BLOOD SUGAR DIAGNOSTIC
STRIP MISCELLANEOUS
Qty: 100 STRIP | Refills: 3 | Status: SHIPPED | OUTPATIENT
Start: 2024-04-03

## 2024-04-03 RX ORDER — METFORMIN HYDROCHLORIDE 500 MG/1
1500 TABLET, EXTENDED RELEASE ORAL
Qty: 270 TABLET | Refills: 3 | Status: SHIPPED | OUTPATIENT
Start: 2024-04-03

## 2024-04-03 RX ORDER — INSULIN GLARGINE 100 [IU]/ML
50 INJECTION, SOLUTION SUBCUTANEOUS NIGHTLY
Qty: 45 ML | Refills: 3 | Status: SHIPPED | OUTPATIENT
Start: 2024-04-03 | End: 2025-04-03

## 2024-04-03 RX ORDER — ORAL SEMAGLUTIDE 14 MG/1
14 TABLET ORAL DAILY
Qty: 90 TABLET | Refills: 3 | Status: SHIPPED | OUTPATIENT
Start: 2024-04-03 | End: 2025-04-03

## 2024-04-03 RX ORDER — DAPAGLIFLOZIN 10 MG/1
10 TABLET, FILM COATED ORAL DAILY
Qty: 90 TABLET | Refills: 3 | Status: SHIPPED | OUTPATIENT
Start: 2024-04-03 | End: 2025-04-03

## 2024-04-03 RX ORDER — PEN NEEDLE, DIABETIC 31 GX5/16"
NEEDLE, DISPOSABLE MISCELLANEOUS
Qty: 100 EACH | Refills: 3 | Status: SHIPPED | OUTPATIENT
Start: 2024-04-03

## 2024-04-03 RX ORDER — ESCITALOPRAM OXALATE 10 MG/1
10 TABLET ORAL DAILY
Qty: 90 TABLET | Refills: 3 | Status: SHIPPED | OUTPATIENT
Start: 2024-04-03 | End: 2025-04-03

## 2024-04-03 RX ORDER — GABAPENTIN 300 MG/1
300 CAPSULE ORAL 2 TIMES DAILY PRN
Qty: 180 CAPSULE | Refills: 3 | Status: SHIPPED | OUTPATIENT
Start: 2024-04-03 | End: 2025-03-29

## 2024-04-03 SDOH — ECONOMIC STABILITY: FOOD INSECURITY: WITHIN THE PAST 12 MONTHS, THE FOOD YOU BOUGHT JUST DIDN'T LAST AND YOU DIDN'T HAVE MONEY TO GET MORE.: NEVER TRUE

## 2024-04-03 SDOH — ECONOMIC STABILITY: FOOD INSECURITY: WITHIN THE PAST 12 MONTHS, YOU WORRIED THAT YOUR FOOD WOULD RUN OUT BEFORE YOU GOT MONEY TO BUY MORE.: NEVER TRUE

## 2024-04-03 SDOH — ECONOMIC STABILITY: HOUSING INSECURITY
IN THE LAST 12 MONTHS, WAS THERE A TIME WHEN YOU DID NOT HAVE A STEADY PLACE TO SLEEP OR SLEPT IN A SHELTER (INCLUDING NOW)?: NO

## 2024-04-03 SDOH — ECONOMIC STABILITY: INCOME INSECURITY: HOW HARD IS IT FOR YOU TO PAY FOR THE VERY BASICS LIKE FOOD, HOUSING, MEDICAL CARE, AND HEATING?: NOT HARD AT ALL

## 2024-04-03 ASSESSMENT — ENCOUNTER SYMPTOMS: RESPIRATORY NEGATIVE: 1

## 2024-04-03 ASSESSMENT — PATIENT HEALTH QUESTIONNAIRE - PHQ9
4. FEELING TIRED OR HAVING LITTLE ENERGY: NOT AT ALL
8. MOVING OR SPEAKING SO SLOWLY THAT OTHER PEOPLE COULD HAVE NOTICED. OR THE OPPOSITE, BEING SO FIGETY OR RESTLESS THAT YOU HAVE BEEN MOVING AROUND A LOT MORE THAN USUAL: NOT AT ALL
10. IF YOU CHECKED OFF ANY PROBLEMS, HOW DIFFICULT HAVE THESE PROBLEMS MADE IT FOR YOU TO DO YOUR WORK, TAKE CARE OF THINGS AT HOME, OR GET ALONG WITH OTHER PEOPLE: NOT DIFFICULT AT ALL
SUM OF ALL RESPONSES TO PHQ QUESTIONS 1-9: 0
2. FEELING DOWN, DEPRESSED OR HOPELESS: NOT AT ALL
3. TROUBLE FALLING OR STAYING ASLEEP: NOT AT ALL
6. FEELING BAD ABOUT YOURSELF - OR THAT YOU ARE A FAILURE OR HAVE LET YOURSELF OR YOUR FAMILY DOWN: NOT AT ALL
SUM OF ALL RESPONSES TO PHQ9 QUESTIONS 1 & 2: 0
1. LITTLE INTEREST OR PLEASURE IN DOING THINGS: NOT AT ALL
SUM OF ALL RESPONSES TO PHQ QUESTIONS 1-9: 0
9. THOUGHTS THAT YOU WOULD BE BETTER OFF DEAD, OR OF HURTING YOURSELF: NOT AT ALL
7. TROUBLE CONCENTRATING ON THINGS, SUCH AS READING THE NEWSPAPER OR WATCHING TELEVISION: NOT AT ALL
SUM OF ALL RESPONSES TO PHQ QUESTIONS 1-9: 0
5. POOR APPETITE OR OVEREATING: NOT AT ALL
SUM OF ALL RESPONSES TO PHQ QUESTIONS 1-9: 0

## 2024-04-03 NOTE — PROGRESS NOTES
R-3Normal         Return in about 6 months (around 10/3/2024) for labs soon, f/u with labs prior.       Scott Reed MD

## 2024-04-19 ENCOUNTER — NURSE ONLY (OUTPATIENT)
Dept: FAMILY MEDICINE CLINIC | Facility: CLINIC | Age: 59
End: 2024-04-19

## 2024-04-19 DIAGNOSIS — Z12.5 SCREENING FOR PROSTATE CANCER: ICD-10-CM

## 2024-04-19 DIAGNOSIS — E78.00 HYPERCHOLESTEREMIA: ICD-10-CM

## 2024-04-19 DIAGNOSIS — Z79.4 TYPE 2 DIABETES MELLITUS WITH HYPERGLYCEMIA, WITH LONG-TERM CURRENT USE OF INSULIN (HCC): ICD-10-CM

## 2024-04-19 DIAGNOSIS — E11.65 TYPE 2 DIABETES MELLITUS WITH HYPERGLYCEMIA, WITH LONG-TERM CURRENT USE OF INSULIN (HCC): ICD-10-CM

## 2024-04-19 LAB
ALBUMIN SERPL-MCNC: 4 G/DL (ref 3.2–4.6)
ALBUMIN/GLOB SERPL: 1.3 (ref 0.4–1.6)
ALP SERPL-CCNC: 71 U/L (ref 50–136)
ALT SERPL-CCNC: 24 U/L (ref 12–65)
ANION GAP SERPL CALC-SCNC: 3 MMOL/L (ref 2–11)
AST SERPL-CCNC: 17 U/L (ref 15–37)
BILIRUB SERPL-MCNC: 0.3 MG/DL (ref 0.2–1.1)
BUN SERPL-MCNC: 14 MG/DL (ref 8–23)
CALCIUM SERPL-MCNC: 9.7 MG/DL (ref 8.3–10.4)
CHLORIDE SERPL-SCNC: 108 MMOL/L (ref 103–113)
CHOLEST SERPL-MCNC: 310 MG/DL
CO2 SERPL-SCNC: 25 MMOL/L (ref 21–32)
CREAT SERPL-MCNC: 0.7 MG/DL (ref 0.8–1.5)
EST. AVERAGE GLUCOSE BLD GHB EST-MCNC: 186 MG/DL
GLOBULIN SER CALC-MCNC: 3 G/DL (ref 2.8–4.5)
GLUCOSE SERPL-MCNC: 110 MG/DL (ref 65–100)
HBA1C MFR BLD: 8.1 % (ref 4.8–5.6)
HDLC SERPL-MCNC: 43 MG/DL (ref 40–60)
HDLC SERPL: 7.2
LDLC SERPL CALC-MCNC: 240.4 MG/DL
POTASSIUM SERPL-SCNC: 4.3 MMOL/L (ref 3.5–5.1)
PROT SERPL-MCNC: 7 G/DL (ref 6.3–8.2)
PSA SERPL-MCNC: 0.3 NG/ML
SODIUM SERPL-SCNC: 136 MMOL/L (ref 136–146)
TRIGL SERPL-MCNC: 133 MG/DL (ref 35–150)
VLDLC SERPL CALC-MCNC: 26.6 MG/DL (ref 6–23)

## 2024-05-06 DIAGNOSIS — E78.01 FAMILIAL HYPERCHOLESTEROLEMIA: Primary | ICD-10-CM

## 2024-05-07 DIAGNOSIS — E78.00 HYPERCHOLESTEREMIA: Primary | ICD-10-CM

## 2024-05-08 ENCOUNTER — NURSE ONLY (OUTPATIENT)
Dept: FAMILY MEDICINE CLINIC | Facility: CLINIC | Age: 59
End: 2024-05-08

## 2024-05-08 DIAGNOSIS — E78.01 FAMILIAL HYPERCHOLESTEROLEMIA: ICD-10-CM

## 2024-05-08 RX ORDER — ROSUVASTATIN CALCIUM 40 MG/1
40 TABLET, COATED ORAL NIGHTLY
Qty: 90 TABLET | Refills: 3 | Status: SHIPPED | OUTPATIENT
Start: 2024-05-08 | End: 2025-05-08

## 2024-05-10 LAB — LPA SERPL-SCNC: 201 NMOL/L

## 2024-06-07 ENCOUNTER — TELEPHONE (OUTPATIENT)
Dept: FAMILY MEDICINE CLINIC | Facility: CLINIC | Age: 59
End: 2024-06-07

## 2024-06-07 DIAGNOSIS — E78.00 HYPERCHOLESTEREMIA: ICD-10-CM

## 2024-06-07 DIAGNOSIS — E78.01 FAMILIAL HYPERCHOLESTEROLEMIA: Primary | ICD-10-CM

## 2024-06-26 RX ORDER — CELECOXIB 200 MG/1
200 CAPSULE ORAL 2 TIMES DAILY
Qty: 180 CAPSULE | Refills: 3 | Status: SHIPPED | OUTPATIENT
Start: 2024-06-26 | End: 2025-06-26

## 2024-06-26 NOTE — TELEPHONE ENCOUNTER
Patients wife called and stated she did not see prescription for the celebrex on patients chart. She stated that he is still currently on this and needs a refill to [facility]

## 2024-07-22 ENCOUNTER — OFFICE VISIT (OUTPATIENT)
Dept: FAMILY MEDICINE CLINIC | Facility: CLINIC | Age: 59
End: 2024-07-22
Payer: COMMERCIAL

## 2024-07-22 VITALS
HEART RATE: 89 BPM | OXYGEN SATURATION: 96 % | BODY MASS INDEX: 29.33 KG/M2 | WEIGHT: 198 LBS | SYSTOLIC BLOOD PRESSURE: 120 MMHG | HEIGHT: 69 IN | DIASTOLIC BLOOD PRESSURE: 80 MMHG

## 2024-07-22 DIAGNOSIS — I10 BENIGN ESSENTIAL HTN: ICD-10-CM

## 2024-07-22 DIAGNOSIS — E11.65 TYPE 2 DIABETES MELLITUS WITH HYPERGLYCEMIA, WITH LONG-TERM CURRENT USE OF INSULIN (HCC): Primary | ICD-10-CM

## 2024-07-22 DIAGNOSIS — M79.10 MYALGIA: ICD-10-CM

## 2024-07-22 DIAGNOSIS — Z79.4 TYPE 2 DIABETES MELLITUS WITH HYPERGLYCEMIA, WITH LONG-TERM CURRENT USE OF INSULIN (HCC): Primary | ICD-10-CM

## 2024-07-22 DIAGNOSIS — E78.00 HYPERCHOLESTEREMIA: ICD-10-CM

## 2024-07-22 PROCEDURE — 3052F HG A1C>EQUAL 8.0%<EQUAL 9.0%: CPT | Performed by: FAMILY MEDICINE

## 2024-07-22 PROCEDURE — 3079F DIAST BP 80-89 MM HG: CPT | Performed by: FAMILY MEDICINE

## 2024-07-22 PROCEDURE — 99214 OFFICE O/P EST MOD 30 MIN: CPT | Performed by: FAMILY MEDICINE

## 2024-07-22 PROCEDURE — 3074F SYST BP LT 130 MM HG: CPT | Performed by: FAMILY MEDICINE

## 2024-07-22 ASSESSMENT — ENCOUNTER SYMPTOMS: RESPIRATORY NEGATIVE: 1

## 2024-07-22 NOTE — PROGRESS NOTES
chiropractor.  Will check a CPK today, he is on high-dose Crestor         No follow-ups on file.       Scott Reed MD

## 2024-07-24 ENCOUNTER — LAB (OUTPATIENT)
Dept: FAMILY MEDICINE CLINIC | Facility: CLINIC | Age: 59
End: 2024-07-24

## 2024-07-24 DIAGNOSIS — M79.10 MYALGIA: ICD-10-CM

## 2024-07-24 DIAGNOSIS — Z79.4 TYPE 2 DIABETES MELLITUS WITH HYPERGLYCEMIA, WITH LONG-TERM CURRENT USE OF INSULIN (HCC): ICD-10-CM

## 2024-07-24 DIAGNOSIS — I10 BENIGN ESSENTIAL HTN: ICD-10-CM

## 2024-07-24 DIAGNOSIS — E11.65 TYPE 2 DIABETES MELLITUS WITH HYPERGLYCEMIA, WITH LONG-TERM CURRENT USE OF INSULIN (HCC): ICD-10-CM

## 2024-07-24 DIAGNOSIS — E78.00 HYPERCHOLESTEREMIA: ICD-10-CM

## 2024-07-24 LAB
ALBUMIN SERPL-MCNC: 4.3 G/DL (ref 3.2–4.6)
ALBUMIN/GLOB SERPL: 1.5 (ref 1–1.9)
ALP SERPL-CCNC: 69 U/L (ref 40–129)
ALT SERPL-CCNC: 25 U/L (ref 12–65)
ANION GAP SERPL CALC-SCNC: 11 MMOL/L (ref 9–18)
AST SERPL-CCNC: 23 U/L (ref 15–37)
BASOPHILS # BLD: 0 K/UL (ref 0–0.2)
BASOPHILS NFR BLD: 0 % (ref 0–2)
BILIRUB SERPL-MCNC: 0.3 MG/DL (ref 0–1.2)
BUN SERPL-MCNC: 15 MG/DL (ref 8–23)
CALCIUM SERPL-MCNC: 10.4 MG/DL (ref 8.8–10.2)
CHLORIDE SERPL-SCNC: 102 MMOL/L (ref 98–107)
CHOLEST SERPL-MCNC: 178 MG/DL (ref 0–200)
CK SERPL-CCNC: 119 U/L (ref 21–215)
CO2 SERPL-SCNC: 24 MMOL/L (ref 20–28)
CREAT SERPL-MCNC: 0.72 MG/DL (ref 0.8–1.3)
CREAT UR-MCNC: 86.1 MG/DL (ref 39–259)
DIFFERENTIAL METHOD BLD: ABNORMAL
EOSINOPHIL # BLD: 0.1 K/UL (ref 0–0.8)
EOSINOPHIL NFR BLD: 1 % (ref 0.5–7.8)
ERYTHROCYTE [DISTWIDTH] IN BLOOD BY AUTOMATED COUNT: 13.2 % (ref 11.9–14.6)
EST. AVERAGE GLUCOSE BLD GHB EST-MCNC: 198 MG/DL
GLOBULIN SER CALC-MCNC: 2.9 G/DL (ref 2.3–3.5)
GLUCOSE SERPL-MCNC: 123 MG/DL (ref 70–99)
HBA1C MFR BLD: 8.5 % (ref 0–5.6)
HCT VFR BLD AUTO: 48.1 % (ref 41.1–50.3)
HDLC SERPL-MCNC: 35 MG/DL (ref 40–60)
HDLC SERPL: 5.1 (ref 0–5)
HGB BLD-MCNC: 16.2 G/DL (ref 13.6–17.2)
IMM GRANULOCYTES # BLD AUTO: 0 K/UL (ref 0–0.5)
IMM GRANULOCYTES NFR BLD AUTO: 0 % (ref 0–5)
LDLC SERPL CALC-MCNC: 116 MG/DL (ref 0–100)
LYMPHOCYTES # BLD: 2.4 K/UL (ref 0.5–4.6)
LYMPHOCYTES NFR BLD: 24 % (ref 13–44)
MCH RBC QN AUTO: 32 PG (ref 26.1–32.9)
MCHC RBC AUTO-ENTMCNC: 33.7 G/DL (ref 31.4–35)
MCV RBC AUTO: 95.1 FL (ref 82–102)
MICROALBUMIN UR-MCNC: 2.23 MG/DL (ref 0–20)
MICROALBUMIN/CREAT UR-RTO: 26 MG/G (ref 0–30)
MONOCYTES # BLD: 0.6 K/UL (ref 0.1–1.3)
MONOCYTES NFR BLD: 6 % (ref 4–12)
NEUTS SEG # BLD: 7 K/UL (ref 1.7–8.2)
NEUTS SEG NFR BLD: 69 % (ref 43–78)
NRBC # BLD: 0 K/UL (ref 0–0.2)
PLATELET # BLD AUTO: 260 K/UL (ref 150–450)
PMV BLD AUTO: 9.2 FL (ref 9.4–12.3)
POTASSIUM SERPL-SCNC: 4.8 MMOL/L (ref 3.5–5.1)
PROT SERPL-MCNC: 7.1 G/DL (ref 6.3–8.2)
RBC # BLD AUTO: 5.06 M/UL (ref 4.23–5.6)
SODIUM SERPL-SCNC: 137 MMOL/L (ref 136–145)
TRIGL SERPL-MCNC: 136 MG/DL (ref 0–150)
VLDLC SERPL CALC-MCNC: 27 MG/DL (ref 6–23)
WBC # BLD AUTO: 10.2 K/UL (ref 4.3–11.1)

## 2024-08-13 DIAGNOSIS — E11.65 TYPE 2 DIABETES MELLITUS WITH HYPERGLYCEMIA, WITH LONG-TERM CURRENT USE OF INSULIN (HCC): Primary | ICD-10-CM

## 2024-08-13 DIAGNOSIS — E83.52 HYPERCALCEMIA: ICD-10-CM

## 2024-08-13 DIAGNOSIS — Z79.4 TYPE 2 DIABETES MELLITUS WITH HYPERGLYCEMIA, WITH LONG-TERM CURRENT USE OF INSULIN (HCC): Primary | ICD-10-CM

## 2024-11-26 ENCOUNTER — OFFICE VISIT (OUTPATIENT)
Dept: FAMILY MEDICINE CLINIC | Facility: CLINIC | Age: 59
End: 2024-11-26
Payer: COMMERCIAL

## 2024-11-26 VITALS
HEIGHT: 69 IN | DIASTOLIC BLOOD PRESSURE: 84 MMHG | SYSTOLIC BLOOD PRESSURE: 132 MMHG | HEART RATE: 67 BPM | OXYGEN SATURATION: 98 % | BODY MASS INDEX: 30.07 KG/M2 | WEIGHT: 203 LBS

## 2024-11-26 DIAGNOSIS — Z79.4 TYPE 2 DIABETES MELLITUS WITH HYPERGLYCEMIA, WITH LONG-TERM CURRENT USE OF INSULIN (HCC): Primary | ICD-10-CM

## 2024-11-26 DIAGNOSIS — E11.65 TYPE 2 DIABETES MELLITUS WITH HYPERGLYCEMIA, WITH LONG-TERM CURRENT USE OF INSULIN (HCC): Primary | ICD-10-CM

## 2024-11-26 DIAGNOSIS — I10 BENIGN ESSENTIAL HTN: Chronic | ICD-10-CM

## 2024-11-26 PROCEDURE — 3075F SYST BP GE 130 - 139MM HG: CPT | Performed by: FAMILY MEDICINE

## 2024-11-26 PROCEDURE — 3052F HG A1C>EQUAL 8.0%<EQUAL 9.0%: CPT | Performed by: FAMILY MEDICINE

## 2024-11-26 PROCEDURE — 3079F DIAST BP 80-89 MM HG: CPT | Performed by: FAMILY MEDICINE

## 2024-11-26 PROCEDURE — 99214 OFFICE O/P EST MOD 30 MIN: CPT | Performed by: FAMILY MEDICINE

## 2024-11-26 RX ORDER — SITAGLIPTIN AND METFORMIN HYDROCHLORIDE 1000; 50 MG/1; MG/1
1 TABLET, FILM COATED ORAL 2 TIMES DAILY WITH MEALS
Qty: 180 TABLET | Refills: 3 | Status: SHIPPED | OUTPATIENT
Start: 2024-11-26 | End: 2025-11-21

## 2024-11-26 RX ORDER — REPAGLINIDE 2 MG/1
2 TABLET ORAL
Qty: 90 TABLET | Refills: 3 | Status: SHIPPED | OUTPATIENT
Start: 2024-11-26

## 2024-11-26 ASSESSMENT — ENCOUNTER SYMPTOMS: GASTROINTESTINAL NEGATIVE: 1

## 2024-11-26 NOTE — PROGRESS NOTES
units, stop metformin  2. Benign essential HTN, at goal      -    Continue with Benicar       Return in about 3 months (around 2/26/2025) for f/u with labs prior.       Scott Reed MD

## 2025-02-26 ENCOUNTER — LAB (OUTPATIENT)
Dept: FAMILY MEDICINE CLINIC | Facility: CLINIC | Age: 60
End: 2025-02-26

## 2025-02-26 DIAGNOSIS — E78.00 HYPERCHOLESTEREMIA: ICD-10-CM

## 2025-02-26 DIAGNOSIS — E11.65 TYPE 2 DIABETES MELLITUS WITH HYPERGLYCEMIA, WITH LONG-TERM CURRENT USE OF INSULIN (HCC): ICD-10-CM

## 2025-02-26 DIAGNOSIS — E83.52 HYPERCALCEMIA: ICD-10-CM

## 2025-02-26 DIAGNOSIS — Z79.4 TYPE 2 DIABETES MELLITUS WITH HYPERGLYCEMIA, WITH LONG-TERM CURRENT USE OF INSULIN (HCC): ICD-10-CM

## 2025-02-26 LAB
ALBUMIN SERPL-MCNC: 4.1 G/DL (ref 3.2–4.6)
ALBUMIN/GLOB SERPL: 1.5 (ref 1–1.9)
ALP SERPL-CCNC: 56 U/L (ref 40–129)
ALT SERPL-CCNC: 27 U/L (ref 8–55)
ANION GAP SERPL CALC-SCNC: 13 MMOL/L (ref 7–16)
AST SERPL-CCNC: 22 U/L (ref 15–37)
BASOPHILS # BLD: 0.04 K/UL (ref 0–0.2)
BASOPHILS NFR BLD: 0.4 % (ref 0–2)
BILIRUB SERPL-MCNC: 0.3 MG/DL (ref 0–1.2)
BUN SERPL-MCNC: 16 MG/DL (ref 8–23)
CALCIUM SERPL-MCNC: 9.6 MG/DL (ref 8.8–10.2)
CALCIUM SERPL-MCNC: 9.7 MG/DL (ref 8.8–10.2)
CHLORIDE SERPL-SCNC: 105 MMOL/L (ref 98–107)
CHOLEST SERPL-MCNC: 159 MG/DL (ref 0–200)
CO2 SERPL-SCNC: 22 MMOL/L (ref 20–29)
CREAT SERPL-MCNC: 0.77 MG/DL (ref 0.8–1.3)
CREAT UR-MCNC: 45.9 MG/DL (ref 39–259)
DIFFERENTIAL METHOD BLD: ABNORMAL
EOSINOPHIL # BLD: 0.09 K/UL (ref 0–0.8)
EOSINOPHIL NFR BLD: 1 % (ref 0.5–7.8)
ERYTHROCYTE [DISTWIDTH] IN BLOOD BY AUTOMATED COUNT: 13.1 % (ref 11.9–14.6)
EST. AVERAGE GLUCOSE BLD GHB EST-MCNC: 207 MG/DL
GLOBULIN SER CALC-MCNC: 2.7 G/DL (ref 2.3–3.5)
GLUCOSE SERPL-MCNC: 116 MG/DL (ref 70–99)
HBA1C MFR BLD: 8.9 % (ref 0–5.6)
HCT VFR BLD AUTO: 46.1 % (ref 41.1–50.3)
HDLC SERPL-MCNC: 33 MG/DL (ref 40–60)
HDLC SERPL: 4.9 (ref 0–5)
HGB BLD-MCNC: 15.6 G/DL (ref 13.6–17.2)
IMM GRANULOCYTES # BLD AUTO: 0.03 K/UL (ref 0–0.5)
IMM GRANULOCYTES NFR BLD AUTO: 0.3 % (ref 0–5)
LDLC SERPL CALC-MCNC: 96 MG/DL (ref 0–100)
LYMPHOCYTES # BLD: 2.86 K/UL (ref 0.5–4.6)
LYMPHOCYTES NFR BLD: 30.5 % (ref 13–44)
MCH RBC QN AUTO: 31.7 PG (ref 26.1–32.9)
MCHC RBC AUTO-ENTMCNC: 33.8 G/DL (ref 31.4–35)
MCV RBC AUTO: 93.7 FL (ref 82–102)
MICROALBUMIN UR-MCNC: <1.2 MG/DL (ref 0–20)
MICROALBUMIN/CREAT UR-RTO: NORMAL MG/G (ref 0–30)
MONOCYTES # BLD: 0.67 K/UL (ref 0.1–1.3)
MONOCYTES NFR BLD: 7.1 % (ref 4–12)
NEUTS SEG # BLD: 5.69 K/UL (ref 1.7–8.2)
NEUTS SEG NFR BLD: 60.7 % (ref 43–78)
NRBC # BLD: 0 K/UL (ref 0–0.2)
PLATELET # BLD AUTO: 245 K/UL (ref 150–450)
PMV BLD AUTO: 9 FL (ref 9.4–12.3)
POTASSIUM SERPL-SCNC: 4.6 MMOL/L (ref 3.5–5.1)
PROT SERPL-MCNC: 6.8 G/DL (ref 6.3–8.2)
PTH-INTACT SERPL-MCNC: 20.9 PG/ML (ref 15–65)
RBC # BLD AUTO: 4.92 M/UL (ref 4.23–5.6)
SODIUM SERPL-SCNC: 139 MMOL/L (ref 136–145)
TRIGL SERPL-MCNC: 150 MG/DL (ref 0–150)
VLDLC SERPL CALC-MCNC: 30 MG/DL (ref 6–23)
WBC # BLD AUTO: 9.4 K/UL (ref 4.3–11.1)

## 2025-02-27 ENCOUNTER — OFFICE VISIT (OUTPATIENT)
Dept: FAMILY MEDICINE CLINIC | Facility: CLINIC | Age: 60
End: 2025-02-27
Payer: COMMERCIAL

## 2025-02-27 VITALS
BODY MASS INDEX: 29.59 KG/M2 | HEIGHT: 69 IN | SYSTOLIC BLOOD PRESSURE: 118 MMHG | RESPIRATION RATE: 16 BRPM | DIASTOLIC BLOOD PRESSURE: 68 MMHG | WEIGHT: 199.8 LBS

## 2025-02-27 DIAGNOSIS — F33.0 MAJOR DEPRESSIVE DISORDER, RECURRENT, MILD: ICD-10-CM

## 2025-02-27 DIAGNOSIS — E78.00 HYPERCHOLESTEREMIA: ICD-10-CM

## 2025-02-27 DIAGNOSIS — E11.65 TYPE 2 DIABETES MELLITUS WITH HYPERGLYCEMIA, WITH LONG-TERM CURRENT USE OF INSULIN (HCC): Primary | ICD-10-CM

## 2025-02-27 DIAGNOSIS — Z79.4 TYPE 2 DIABETES MELLITUS WITH HYPERGLYCEMIA, WITH LONG-TERM CURRENT USE OF INSULIN (HCC): Primary | ICD-10-CM

## 2025-02-27 DIAGNOSIS — G89.29 CHRONIC LEFT-SIDED LOW BACK PAIN WITH LEFT-SIDED SCIATICA: ICD-10-CM

## 2025-02-27 DIAGNOSIS — M54.42 CHRONIC LEFT-SIDED LOW BACK PAIN WITH LEFT-SIDED SCIATICA: ICD-10-CM

## 2025-02-27 DIAGNOSIS — I10 BENIGN ESSENTIAL HTN: Chronic | ICD-10-CM

## 2025-02-27 PROCEDURE — 99214 OFFICE O/P EST MOD 30 MIN: CPT | Performed by: FAMILY MEDICINE

## 2025-02-27 PROCEDURE — 3078F DIAST BP <80 MM HG: CPT | Performed by: FAMILY MEDICINE

## 2025-02-27 PROCEDURE — 3074F SYST BP LT 130 MM HG: CPT | Performed by: FAMILY MEDICINE

## 2025-02-27 PROCEDURE — 3052F HG A1C>EQUAL 8.0%<EQUAL 9.0%: CPT | Performed by: FAMILY MEDICINE

## 2025-02-27 RX ORDER — BLOOD SUGAR DIAGNOSTIC
STRIP MISCELLANEOUS
Qty: 100 STRIP | Refills: 3 | Status: SHIPPED | OUTPATIENT
Start: 2025-02-27

## 2025-02-27 RX ORDER — PEN NEEDLE, DIABETIC 31 GX5/16"
NEEDLE, DISPOSABLE MISCELLANEOUS
Qty: 100 EACH | Refills: 3 | Status: SHIPPED | OUTPATIENT
Start: 2025-02-27

## 2025-02-27 RX ORDER — OLMESARTAN MEDOXOMIL 20 MG/1
20 TABLET ORAL DAILY
Qty: 90 TABLET | Refills: 3 | Status: SHIPPED | OUTPATIENT
Start: 2025-02-27 | End: 2026-02-27

## 2025-02-27 RX ORDER — GABAPENTIN 300 MG/1
300 CAPSULE ORAL 2 TIMES DAILY PRN
Qty: 180 CAPSULE | Refills: 3 | Status: SHIPPED | OUTPATIENT
Start: 2025-02-27 | End: 2026-02-22

## 2025-02-27 RX ORDER — INSULIN GLARGINE 100 [IU]/ML
50 INJECTION, SOLUTION SUBCUTANEOUS NIGHTLY
Qty: 45 ML | Refills: 3 | Status: SHIPPED | OUTPATIENT
Start: 2025-02-27 | End: 2026-02-27

## 2025-02-27 RX ORDER — CELECOXIB 200 MG/1
200 CAPSULE ORAL 2 TIMES DAILY
Qty: 180 CAPSULE | Refills: 3 | Status: SHIPPED | OUTPATIENT
Start: 2025-02-27 | End: 2026-02-27

## 2025-02-27 RX ORDER — DAPAGLIFLOZIN 10 MG/1
10 TABLET, FILM COATED ORAL DAILY
Qty: 90 TABLET | Refills: 3 | Status: SHIPPED | OUTPATIENT
Start: 2025-02-27 | End: 2026-02-27

## 2025-02-27 RX ORDER — ESCITALOPRAM OXALATE 10 MG/1
10 TABLET ORAL DAILY
Qty: 90 TABLET | Refills: 3 | Status: SHIPPED | OUTPATIENT
Start: 2025-02-27 | End: 2026-02-27

## 2025-02-27 RX ORDER — ROSUVASTATIN CALCIUM 40 MG/1
40 TABLET, COATED ORAL NIGHTLY
Qty: 90 TABLET | Refills: 3 | Status: SHIPPED | OUTPATIENT
Start: 2025-02-27 | End: 2026-02-27

## 2025-02-27 SDOH — ECONOMIC STABILITY: FOOD INSECURITY: WITHIN THE PAST 12 MONTHS, THE FOOD YOU BOUGHT JUST DIDN'T LAST AND YOU DIDN'T HAVE MONEY TO GET MORE.: NEVER TRUE

## 2025-02-27 SDOH — ECONOMIC STABILITY: FOOD INSECURITY: WITHIN THE PAST 12 MONTHS, YOU WORRIED THAT YOUR FOOD WOULD RUN OUT BEFORE YOU GOT MONEY TO BUY MORE.: NEVER TRUE

## 2025-02-27 ASSESSMENT — PATIENT HEALTH QUESTIONNAIRE - PHQ9
4. FEELING TIRED OR HAVING LITTLE ENERGY: NOT AT ALL
SUM OF ALL RESPONSES TO PHQ QUESTIONS 1-9: 0
6. FEELING BAD ABOUT YOURSELF - OR THAT YOU ARE A FAILURE OR HAVE LET YOURSELF OR YOUR FAMILY DOWN: NOT AT ALL
8. MOVING OR SPEAKING SO SLOWLY THAT OTHER PEOPLE COULD HAVE NOTICED. OR THE OPPOSITE, BEING SO FIGETY OR RESTLESS THAT YOU HAVE BEEN MOVING AROUND A LOT MORE THAN USUAL: NOT AT ALL
SUM OF ALL RESPONSES TO PHQ QUESTIONS 1-9: 0
SUM OF ALL RESPONSES TO PHQ QUESTIONS 1-9: 0
2. FEELING DOWN, DEPRESSED OR HOPELESS: NOT AT ALL
9. THOUGHTS THAT YOU WOULD BE BETTER OFF DEAD, OR OF HURTING YOURSELF: NOT AT ALL
10. IF YOU CHECKED OFF ANY PROBLEMS, HOW DIFFICULT HAVE THESE PROBLEMS MADE IT FOR YOU TO DO YOUR WORK, TAKE CARE OF THINGS AT HOME, OR GET ALONG WITH OTHER PEOPLE: NOT DIFFICULT AT ALL
5. POOR APPETITE OR OVEREATING: NOT AT ALL
7. TROUBLE CONCENTRATING ON THINGS, SUCH AS READING THE NEWSPAPER OR WATCHING TELEVISION: NOT AT ALL
SUM OF ALL RESPONSES TO PHQ QUESTIONS 1-9: 0
1. LITTLE INTEREST OR PLEASURE IN DOING THINGS: NOT AT ALL
SUM OF ALL RESPONSES TO PHQ9 QUESTIONS 1 & 2: 0
3. TROUBLE FALLING OR STAYING ASLEEP: NOT AT ALL

## 2025-02-27 ASSESSMENT — ENCOUNTER SYMPTOMS: RESPIRATORY NEGATIVE: 1

## 2025-02-27 NOTE — PROGRESS NOTES
PROGRESS NOTE    SUBJECTIVE:   Jeff Meza is a 59 y.o. male seen for a follow up visit regarding   Chief Complaint   Patient presents with    Diabetes     Lab Review   Janumet is supposed to be XR, not regular.         GAPS: Lung CT, eye exam, foot exam        HPI:  Here for follow-up of diabetes.  Recent lab work showed a hemoglobin A1c of 8.9.  His wife is with him today and endorses noncompliance with diabetic diet.  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  He continues to use Lantus, 40 units daily, Prandin 2 mg before every meal although he has very irregular dietary habits so he is likely taking this once a day with supper.  He also continues to take Janumet.         Reviewed and updated this visit by provider:  Tobacco  Allergies  Meds  Problems  Med Hx  Surg Hx  Fam Hx           Review of Systems   Respiratory: Negative.     Cardiovascular: Negative.           OBJECTIVE:  Vitals:    02/27/25 1559   BP: 118/68   Resp: 16   Weight: 90.6 kg (199 lb 12.8 oz)   Height: 1.753 m (5' 9.02\")        Physical Exam   General: Alert and oriented x3, well-appearing  Neck: No adenopathy, thyromegaly or thyroid nodules  Pulmonary: Normal effort, good airflow, no rales or rhonchi  CVS: Regular rate and rhythm, normal S1, S2, no S3 or S4, no murmurs; no carotid bruits, 2+ pedal pulses      Medical problems and test results were reviewed with the patient today.     Recent Results (from the past 672 hour(s))   Microalbumin / Creatinine Urine Ratio    Collection Time: 02/26/25  9:44 AM   Result Value Ref Range    Albumin Urine <1.20 0.00 - 20.00 MG/DL    Creatinine, Ur 45.90 39.00 - 259.00 mg/dL    Albumin/Creatinine Ratio  0 - 30 mg/g     Cannot calculate ratio due to microalbumin result outside reportable range.   Hemoglobin A1C    Collection

## 2025-03-24 RX ORDER — SITAGLIPTIN AND METFORMIN HYDROCHLORIDE 1000; 50 MG/1; MG/1
50 TABLET, FILM COATED ORAL 2 TIMES DAILY
Qty: 180 TABLET | Refills: 1 | Status: SHIPPED | OUTPATIENT
Start: 2025-03-24 | End: 2025-09-20

## 2025-03-24 RX ORDER — REPAGLINIDE 2 MG/1
2 TABLET ORAL
Qty: 270 TABLET | Refills: 1 | Status: SHIPPED | OUTPATIENT
Start: 2025-03-24

## 2025-03-24 NOTE — TELEPHONE ENCOUNTER
Last OV: 2/27/25  Next OV: 5/29/25 with labs prior.     Last written: 11/26/24   For: 90 with 3 refills.

## 2025-03-24 NOTE — TELEPHONE ENCOUNTER
Last OV: 2/27/25  Next OV: 5/29/25 with labs prior     Last written: 11/26/24   For: 180 with 3 refills.     Please notice:   \"    Pharmacy comment: Alternative Requested:THE PRESCRIBED MEDICATION IS NOT COVERED BY INSURANCE. PLEASE CONSIDER CHANGING TO ONE OF THE SUGGESTED COVERED ALTERNATIVES.   \"

## 2025-05-01 DIAGNOSIS — E11.65 TYPE 2 DIABETES MELLITUS WITH HYPERGLYCEMIA, WITH LONG-TERM CURRENT USE OF INSULIN (HCC): Primary | ICD-10-CM

## 2025-05-01 DIAGNOSIS — Z79.4 TYPE 2 DIABETES MELLITUS WITH HYPERGLYCEMIA, WITH LONG-TERM CURRENT USE OF INSULIN (HCC): Primary | ICD-10-CM

## 2025-05-01 NOTE — TELEPHONE ENCOUNTER
Last OV: 2/27/25  Next OV: 5/29/25 with labs prior.     Last written: 3/24/25   For: 180 with 1 refill

## 2025-05-01 NOTE — TELEPHONE ENCOUNTER
Patients wife called and stated patient needs a refill on the sitaGLIPtan-metFORMIN (JANUMET)  MG per tablet she stated she had told pharmacy he no longer took this medicine thinking it was something else and they cancelled the prescription. [facility] is needing [facility]. Patient has 5 left

## 2025-05-02 RX ORDER — SITAGLIPTIN AND METFORMIN HYDROCHLORIDE 1000; 50 MG/1; MG/1
50 TABLET, FILM COATED ORAL 2 TIMES DAILY
Qty: 180 TABLET | Refills: 3 | Status: SHIPPED | OUTPATIENT
Start: 2025-05-02 | End: 2025-10-29

## 2025-05-21 ENCOUNTER — LAB (OUTPATIENT)
Dept: FAMILY MEDICINE CLINIC | Facility: CLINIC | Age: 60
End: 2025-05-21

## 2025-05-21 DIAGNOSIS — E11.65 TYPE 2 DIABETES MELLITUS WITH HYPERGLYCEMIA, WITH LONG-TERM CURRENT USE OF INSULIN (HCC): ICD-10-CM

## 2025-05-21 DIAGNOSIS — E78.00 HYPERCHOLESTEREMIA: ICD-10-CM

## 2025-05-21 DIAGNOSIS — Z79.4 TYPE 2 DIABETES MELLITUS WITH HYPERGLYCEMIA, WITH LONG-TERM CURRENT USE OF INSULIN (HCC): ICD-10-CM

## 2025-05-21 DIAGNOSIS — E11.65 TYPE 2 DIABETES MELLITUS WITH HYPERGLYCEMIA, WITH LONG-TERM CURRENT USE OF INSULIN (HCC): Primary | ICD-10-CM

## 2025-05-21 DIAGNOSIS — Z79.4 TYPE 2 DIABETES MELLITUS WITH HYPERGLYCEMIA, WITH LONG-TERM CURRENT USE OF INSULIN (HCC): Primary | ICD-10-CM

## 2025-05-21 LAB
CHOLEST SERPL-MCNC: 176 MG/DL (ref 0–200)
EST. AVERAGE GLUCOSE BLD GHB EST-MCNC: 150 MG/DL
HBA1C MFR BLD: 6.9 % (ref 0–5.6)
HDLC SERPL-MCNC: 39 MG/DL (ref 40–60)
HDLC SERPL: 4.5 (ref 0–5)
LDLC SERPL CALC-MCNC: 108 MG/DL (ref 0–100)
TRIGL SERPL-MCNC: 141 MG/DL (ref 0–150)
VLDLC SERPL CALC-MCNC: 28 MG/DL (ref 6–23)

## 2025-05-22 RX ORDER — BLOOD SUGAR DIAGNOSTIC
STRIP MISCELLANEOUS
Qty: 100 STRIP | Refills: 3 | Status: SHIPPED | OUTPATIENT
Start: 2025-05-22

## 2025-05-29 ENCOUNTER — OFFICE VISIT (OUTPATIENT)
Dept: FAMILY MEDICINE CLINIC | Facility: CLINIC | Age: 60
End: 2025-05-29
Payer: COMMERCIAL

## 2025-05-29 VITALS
HEIGHT: 69 IN | SYSTOLIC BLOOD PRESSURE: 118 MMHG | DIASTOLIC BLOOD PRESSURE: 68 MMHG | BODY MASS INDEX: 29.06 KG/M2 | WEIGHT: 196.2 LBS | RESPIRATION RATE: 16 BRPM

## 2025-05-29 DIAGNOSIS — K42.9 UMBILICAL HERNIA WITHOUT OBSTRUCTION AND WITHOUT GANGRENE: Primary | ICD-10-CM

## 2025-05-29 DIAGNOSIS — Z12.5 SCREENING FOR PROSTATE CANCER: ICD-10-CM

## 2025-05-29 DIAGNOSIS — E11.65 TYPE 2 DIABETES MELLITUS WITH HYPERGLYCEMIA, WITH LONG-TERM CURRENT USE OF INSULIN (HCC): Chronic | ICD-10-CM

## 2025-05-29 DIAGNOSIS — Z12.11 SCREEN FOR COLON CANCER: ICD-10-CM

## 2025-05-29 DIAGNOSIS — Z79.4 TYPE 2 DIABETES MELLITUS WITH HYPERGLYCEMIA, WITH LONG-TERM CURRENT USE OF INSULIN (HCC): Chronic | ICD-10-CM

## 2025-05-29 DIAGNOSIS — E78.5 DYSLIPIDEMIA: ICD-10-CM

## 2025-05-29 DIAGNOSIS — I10 BENIGN ESSENTIAL HTN: Chronic | ICD-10-CM

## 2025-05-29 PROCEDURE — 3074F SYST BP LT 130 MM HG: CPT | Performed by: FAMILY MEDICINE

## 2025-05-29 PROCEDURE — 99214 OFFICE O/P EST MOD 30 MIN: CPT | Performed by: FAMILY MEDICINE

## 2025-05-29 PROCEDURE — 3044F HG A1C LEVEL LT 7.0%: CPT | Performed by: FAMILY MEDICINE

## 2025-05-29 PROCEDURE — 3078F DIAST BP <80 MM HG: CPT | Performed by: FAMILY MEDICINE

## 2025-05-29 ASSESSMENT — ENCOUNTER SYMPTOMS: RESPIRATORY NEGATIVE: 1

## 2025-05-29 NOTE — PROGRESS NOTES
PROGRESS NOTE    SUBJECTIVE:   Jeff Meza is a 60 y.o. male seen for a follow up visit regarding   Chief Complaint   Patient presents with    Follow-up Chronic Condition     Would like to go ahead and set up Colonoscopy.       GAPS: Eye exam, Foot exam, Lung CT        HPI:  Here today for follow-up of diabetes and chronic problems.  He is doing well, but endorses having some constipation issues.  Taking Metamucil with some relief.  Patient is due for colonoscopy.  Has a history of colon polyps.       Reviewed and updated this visit by provider:           Review of Systems   Respiratory: Negative.     Cardiovascular: Negative.           OBJECTIVE:  Vitals:    05/29/25 1606   BP: 118/68   Resp: 16   Weight: 89 kg (196 lb 3.2 oz)   Height: 1.753 m (5' 9.02\")        Physical Exam   General: Alert and oriented x3, well-appearing  Neck: No adenopathy, thyromegaly or thyroid nodules  Pulmonary: Normal effort, good airflow, no rales or rhonchi  CVS: Regular rate and rhythm, normal S1, S2, no S3 or S4, no murmurs; no carotid bruits, 2+ pedal pulses  Abdomen: Nondistended with normal bowel sounds, patient does have what appears to be a rectus diastasis however there is a palpable supraumbilical defect with a small hernia    Medical problems and test results were reviewed with the patient today.     Recent Results (from the past 4 weeks)   Hemoglobin A1C    Collection Time: 05/21/25 10:51 AM   Result Value Ref Range    Hemoglobin A1C 6.9 (H) 0 - 5.6 %    Estimated Avg Glucose 150 mg/dL   Lipid Panel    Collection Time: 05/21/25 10:51 AM   Result Value Ref Range    Cholesterol, Total 176 0 - 200 MG/DL    Triglycerides 141 0 - 150 MG/DL    HDL 39 (L) 40 - 60 MG/DL    LDL Cholesterol 108 (H) 0 - 100 MG/DL    VLDL Cholesterol Calculated 28 (H) 6 - 23 MG/DL    Chol/HDL Ratio 4.5 0.0 - 5.0         No results found for any visits on 05/29/25.     ASSESSMENT and PLAN    1. Umbilical hernia without obstruction and without

## 2025-06-23 NOTE — PROGRESS NOTES
normal  limits.    The kidneys are unremarkable. The bladder appears grossly normal.    Distal esophagus and stomach are unremarkable. Small and large bowel show no  evidence of obstruction. There is a grossly normal appendix in the right lower  quadrant.    No evidence of free fluid or free air. No pathologic adenopathy. No abdominal  aortic aneurysm.    Osseous structures show no evidence of acute fracture or suspicious lesion.   Impression: No renal ureteral or bladder lithiasis on either side. There is no  hydronephrosis.    There is a grossly normal-appearing appendix in the right lower quadrant.    Hepatic steatosis.    Subtle hyperdensity is noted in the right lobe adjacent to the gallbladder fossa  which may represent edema.        Diagnosis Orders   1. Umbilical hernia without obstruction and without gangrene        2. Abdominal wall bulge        3. Periumbilical pain        4. History of colon polyps            Assessment & Plan  1. Umbilical hernia  - Present for over 10 years with burning pain and pressure (6/10 severity)  - No associated nausea or vomiting  - Currently taking baby aspirin, no steroid use  - CT scan from 2023 confirmed hernia, likely increased in size  - Smoking cessation crucial for optimal healing post-surgery  - Discussed potential risks and benefits of hernia repair surgery, including mesh use and infection risk  - Advised abstaining from heavy lifting for 4 weeks post-surgery  - Recommended weight loss and exercises to strengthen abdominal wall muscles  - Agreed to quit smoking, will reduce intake to 1 or 2 cigarettes per day before completely stopping  - Colonoscopy to be scheduled prior to hernia repair surgery    2. Diastasis recti  - Diagnosed with diastasis recti due to weight gain  - Advised to lose weight and perform exercises to strengthen abdominal wall muscles (e.g., sit-ups and core exercises)    3. Colon cancer screening  - Had colonoscopy approximately 10 years ago, 3

## 2025-06-25 ENCOUNTER — PREP FOR PROCEDURE (OUTPATIENT)
Dept: SURGERY | Age: 60
End: 2025-06-25

## 2025-06-25 ENCOUNTER — OFFICE VISIT (OUTPATIENT)
Dept: SURGERY | Age: 60
End: 2025-06-25
Payer: COMMERCIAL

## 2025-06-25 VITALS
SYSTOLIC BLOOD PRESSURE: 122 MMHG | DIASTOLIC BLOOD PRESSURE: 65 MMHG | HEIGHT: 69 IN | WEIGHT: 200.8 LBS | HEART RATE: 71 BPM | BODY MASS INDEX: 29.74 KG/M2

## 2025-06-25 DIAGNOSIS — Z12.11 SCREEN FOR COLON CANCER: ICD-10-CM

## 2025-06-25 DIAGNOSIS — K42.9 UMBILICAL HERNIA WITHOUT OBSTRUCTION AND WITHOUT GANGRENE: Primary | ICD-10-CM

## 2025-06-25 DIAGNOSIS — Z86.0100 HISTORY OF COLON POLYPS: ICD-10-CM

## 2025-06-25 DIAGNOSIS — R19.00 ABDOMINAL WALL BULGE: ICD-10-CM

## 2025-06-25 DIAGNOSIS — R10.33 PERIUMBILICAL PAIN: ICD-10-CM

## 2025-06-25 DIAGNOSIS — Z72.0 TOBACCO ABUSE: Chronic | ICD-10-CM

## 2025-06-25 PROBLEM — K43.9 VENTRAL HERNIA: Status: ACTIVE | Noted: 2025-06-25

## 2025-06-25 PROCEDURE — 3074F SYST BP LT 130 MM HG: CPT | Performed by: SURGERY

## 2025-06-25 PROCEDURE — 99204 OFFICE O/P NEW MOD 45 MIN: CPT | Performed by: SURGERY

## 2025-06-25 PROCEDURE — 99406 BEHAV CHNG SMOKING 3-10 MIN: CPT | Performed by: SURGERY

## 2025-06-25 PROCEDURE — 3078F DIAST BP <80 MM HG: CPT | Performed by: SURGERY

## 2025-07-14 NOTE — PROGRESS NOTES
Patient's wife, Sheryl Meza, verified pt's name, , and procedure. Communication Release of Information noted in chart.    Type: 1A; abbreviated assessment per anesthesia guidelines.    Labs per anesthesia: POCT Glucose- held for DOS.  EKG: Not needed, per anesthesia guidelines.     Instructed that they will be notified the day before their procedure by the GI Lab for time of arrival if their procedure is [place] and Pre-op for [facility] cases. Arrival times should be called by 5 pm. If no phone is received the patient should contact their respective hospital. The GI lab telephone number is [phone] and ES Pre-op is [phone].     Follow diet and prep instructions per office, including NPO status.      Bath or shower the night before and the am of surgery with non-moisturizing soap. No lotions, oils, powders, perfumes, or cologne on skin. No make up, eye make up or jewelry. Wear loose fitting comfortable, clean clothing.     Must have adult present in building the entire time .     Medications to take on the day of procedure: Gabapentin- if needed, Glargine insulin- take 40 units the night before (80% of normal dose), per anesthesia guidelines.    Medications to hold: Aspirin- hold starting now, Celecoxib- hold starting now, Dapagliflozin- hold 3 days prior, per anesthesia guidelines.     Instructed to hold all vitamins/supplements 7 days prior to surgery and NSAIDS 5 days prior to surgery. Verbalized understanding.     The following discharge instructions reviewed: medication given during procedure may cause drowsiness for several hours, therefore, do not drive or operate machinery for remainder of the day. You may not drink alcohol on the day of your procedure, please resume regular diet and activity unless otherwise directed. You may experience abdominal distention for several hours that is relieved by the passage of gas. Contact your physician if you have any of the following: fever or chills, severe abdominal

## 2025-07-17 ENCOUNTER — ANESTHESIA EVENT (OUTPATIENT)
Dept: ENDOSCOPY | Age: 60
End: 2025-07-17
Payer: COMMERCIAL

## 2025-07-18 ENCOUNTER — ANESTHESIA (OUTPATIENT)
Dept: ENDOSCOPY | Age: 60
End: 2025-07-18
Payer: COMMERCIAL

## 2025-07-18 ENCOUNTER — HOSPITAL ENCOUNTER (OUTPATIENT)
Age: 60
Setting detail: OUTPATIENT SURGERY
Discharge: HOME | End: 2025-07-18
Attending: SURGERY | Admitting: SURGERY
Payer: COMMERCIAL

## 2025-07-18 VITALS
RESPIRATION RATE: 13 BRPM | BODY MASS INDEX: 29.33 KG/M2 | HEART RATE: 64 BPM | DIASTOLIC BLOOD PRESSURE: 76 MMHG | WEIGHT: 198 LBS | OXYGEN SATURATION: 96 % | SYSTOLIC BLOOD PRESSURE: 129 MMHG | HEIGHT: 69 IN | TEMPERATURE: 98.5 F

## 2025-07-18 DIAGNOSIS — Z12.11 SCREEN FOR COLON CANCER: ICD-10-CM

## 2025-07-18 LAB
GLUCOSE BLD STRIP.AUTO-MCNC: 146 MG/DL (ref 65–100)
SERVICE CMNT-IMP: ABNORMAL

## 2025-07-18 PROCEDURE — 3700000001 HC ADD 15 MINUTES (ANESTHESIA): Performed by: SURGERY

## 2025-07-18 PROCEDURE — 82962 GLUCOSE BLOOD TEST: CPT

## 2025-07-18 PROCEDURE — 6360000002 HC RX W HCPCS: Performed by: ANESTHESIOLOGY

## 2025-07-18 PROCEDURE — 2580000003 HC RX 258: Performed by: ANESTHESIOLOGY

## 2025-07-18 PROCEDURE — 3609010400 HC COLONOSCOPY POLYPECTOMY HOT BIOPSY: Performed by: SURGERY

## 2025-07-18 PROCEDURE — 7100000010 HC PHASE II RECOVERY - FIRST 15 MIN: Performed by: SURGERY

## 2025-07-18 PROCEDURE — 2500000003 HC RX 250 WO HCPCS: Performed by: NURSE ANESTHETIST, CERTIFIED REGISTERED

## 2025-07-18 PROCEDURE — 88305 TISSUE EXAM BY PATHOLOGIST: CPT

## 2025-07-18 PROCEDURE — 6360000002 HC RX W HCPCS: Performed by: NURSE ANESTHETIST, CERTIFIED REGISTERED

## 2025-07-18 PROCEDURE — 7100000011 HC PHASE II RECOVERY - ADDTL 15 MIN: Performed by: SURGERY

## 2025-07-18 PROCEDURE — 3700000000 HC ANESTHESIA ATTENDED CARE: Performed by: SURGERY

## 2025-07-18 PROCEDURE — 2709999900 HC NON-CHARGEABLE SUPPLY: Performed by: SURGERY

## 2025-07-18 RX ORDER — SODIUM CHLORIDE 0.9 % (FLUSH) 0.9 %
5-40 SYRINGE (ML) INJECTION PRN
Status: DISCONTINUED | OUTPATIENT
Start: 2025-07-18 | End: 2025-07-18 | Stop reason: HOSPADM

## 2025-07-18 RX ORDER — LIDOCAINE HYDROCHLORIDE 20 MG/ML
INJECTION, SOLUTION EPIDURAL; INFILTRATION; INTRACAUDAL; PERINEURAL
Status: DISCONTINUED | OUTPATIENT
Start: 2025-07-18 | End: 2025-07-18 | Stop reason: SDUPTHER

## 2025-07-18 RX ORDER — SODIUM CHLORIDE 9 MG/ML
INJECTION, SOLUTION INTRAVENOUS PRN
Status: DISCONTINUED | OUTPATIENT
Start: 2025-07-18 | End: 2025-07-18 | Stop reason: HOSPADM

## 2025-07-18 RX ORDER — SODIUM CHLORIDE, SODIUM LACTATE, POTASSIUM CHLORIDE, CALCIUM CHLORIDE 600; 310; 30; 20 MG/100ML; MG/100ML; MG/100ML; MG/100ML
INJECTION, SOLUTION INTRAVENOUS CONTINUOUS
Status: DISCONTINUED | OUTPATIENT
Start: 2025-07-18 | End: 2025-07-18 | Stop reason: HOSPADM

## 2025-07-18 RX ORDER — EPHEDRINE SULFATE/0.9% NACL/PF 50 MG/5 ML
SYRINGE (ML) INTRAVENOUS
Status: DISCONTINUED | OUTPATIENT
Start: 2025-07-18 | End: 2025-07-18 | Stop reason: SDUPTHER

## 2025-07-18 RX ORDER — PROPOFOL 10 MG/ML
INJECTION, EMULSION INTRAVENOUS
Status: DISCONTINUED | OUTPATIENT
Start: 2025-07-18 | End: 2025-07-18 | Stop reason: SDUPTHER

## 2025-07-18 RX ORDER — SODIUM CHLORIDE 0.9 % (FLUSH) 0.9 %
5-40 SYRINGE (ML) INJECTION EVERY 12 HOURS SCHEDULED
Status: DISCONTINUED | OUTPATIENT
Start: 2025-07-18 | End: 2025-07-18 | Stop reason: HOSPADM

## 2025-07-18 RX ORDER — LIDOCAINE HYDROCHLORIDE 10 MG/ML
1 INJECTION, SOLUTION INFILTRATION; PERINEURAL
Status: COMPLETED | OUTPATIENT
Start: 2025-07-18 | End: 2025-07-18

## 2025-07-18 RX ADMIN — PROPOFOL 200 MCG/KG/MIN: 10 INJECTION, EMULSION INTRAVENOUS at 09:23

## 2025-07-18 RX ADMIN — Medication 10 MG: at 09:25

## 2025-07-18 RX ADMIN — LIDOCAINE HYDROCHLORIDE 1 ML: 10 INJECTION, SOLUTION INFILTRATION; PERINEURAL at 08:38

## 2025-07-18 RX ADMIN — PROPOFOL 50 MG: 10 INJECTION, EMULSION INTRAVENOUS at 09:22

## 2025-07-18 RX ADMIN — SODIUM CHLORIDE, SODIUM LACTATE, POTASSIUM CHLORIDE, AND CALCIUM CHLORIDE: .6; .31; .03; .02 INJECTION, SOLUTION INTRAVENOUS at 08:38

## 2025-07-18 RX ADMIN — LIDOCAINE HYDROCHLORIDE 100 MG: 20 INJECTION, SOLUTION EPIDURAL; INFILTRATION; INTRACAUDAL; PERINEURAL at 09:22

## 2025-07-18 ASSESSMENT — PAIN - FUNCTIONAL ASSESSMENT: PAIN_FUNCTIONAL_ASSESSMENT: 0-10

## 2025-07-18 ASSESSMENT — LIFESTYLE VARIABLES: SMOKING_STATUS: 1

## 2025-07-18 NOTE — H&P
hepatopedal flow.    Gallbladder: Gallbladder is without evidence of stones, sludge, or wall  thickening. Sonographic Iverson sign was negative. The wall measures 1.8 mm.    CBD: Normal in caliber and measures 4.4 mm.    Right kidney: 14.3 cm in length and without evidence of obstruction. There may  be a dual collecting system.  Impression: Hepatic steatosis. Hepatomegaly.    There appears to be a dual collecting system of the right kidney.    The pancreas was obscured by bowel gas.    Unremarkable examination of the gallbladder.  CT ABDOMEN PELVIS RENAL STONE  Narrative: EXAM: CT ABDOMEN PELVIS RENAL STONE    HISTORY: Right flank pain radiating to the right lower quadrant.    TECHNIQUE: Axial images of the abdomen and pelvis were performed without  intravenous contrast. Images were obtained in the axial plane and coronal  reformatted images were created and reviewed.     Dose reduction technique used: Automated exposure control/Adjustment of the mA  and/or kV according to patient size/Use of iterative reconstruction technique.    COMPARISON: None.    FINDINGS:   Visualized lung bases and mediastinum are unremarkable.    The visualized liver shows diffuse low density suggesting fatty infiltration.  Subtle hyperdensity is noted in the right lobe adjacent to the gallbladder fossa  which may represent edema.    The visualized spleen, pancreas, adrenal glands, gallbladder, are within normal  limits.    The kidneys are unremarkable. The bladder appears grossly normal.    Distal esophagus and stomach are unremarkable. Small and large bowel show no  evidence of obstruction. There is a grossly normal appendix in the right lower  quadrant.    No evidence of free fluid or free air. No pathologic adenopathy. No abdominal  aortic aneurysm.    Osseous structures show no evidence of acute fracture or suspicious lesion.   Impression: No renal ureteral or bladder lithiasis on either side. There is no  hydronephrosis.    There is a  determined.         The patient (or guardian, if applicable) and other individuals in attendance with the patient were advised that Artificial Intelligence will be utilized during this visit to record, process the conversation to generate a clinical note, and support improvement of the AI technology. The patient (or guardian, if applicable) and other individuals in attendance at the appointment consented to the use of AI, including the recording.        Signed: Sariah Harkins MD  Bariatric & Minimally Invasive Surgery  7/18/2025 8:29 AM

## 2025-07-18 NOTE — ANESTHESIA PRE PROCEDURE
Department of Anesthesiology  Preprocedure Note       Name:  Jeff Meza   Age:  60 y.o.  :  1965                                          MRN:  [id number]         Date:  2025      Surgeon: Surgeon(s):  Sariah Ochoa MD    Procedure: Procedure(s):  COLORECTAL CANCER SCREENING, NOT HIGH RISK    Medications prior to admission:   Prior to Admission medications    Medication Sig Start Date End Date Taking? Authorizing Provider   SITagliptin Base-metFORMIN HCl (ZITUVIMET)  MG TABS Take 50 mg by mouth in the morning and at bedtime 5/2/25 10/29/25 Yes Amita Goff PA   repaglinide (PRANDIN) 2 MG tablet TAKE 1 TABLET BY MOUTH 3 TIMES DAILY (BEFORE MEALS). 3/24/25  Yes Scott Reed MD   rosuvastatin (CRESTOR) 40 MG tablet Take 1 tablet by mouth nightly 25 Yes Scott Reed MD   olmesartan (BENICAR) 20 MG tablet Take 1 tablet by mouth daily 25 Yes Scott Reed MD   insulin glargine (LANTUS SOLOSTAR) 100 UNIT/ML injection pen Inject 50 Units into the skin nightly 25 Yes Scott Reed MD   escitalopram (LEXAPRO) 10 MG tablet Take 1 tablet by mouth daily  Patient taking differently: Take 1 tablet by mouth at bedtime 25 Yes Scott Reed MD   dapagliflozin (FARXIGA) 10 MG tablet Take 1 tablet by mouth daily 25 Yes Scott Reed MD   celecoxib (CELEBREX) 200 MG capsule Take 1 capsule by mouth 2 times daily 25 Yes Scott Reed MD   aspirin 81 MG EC tablet Take 1 tablet by mouth daily For prevention   Yes Automatic Reconciliation, Ar   Cholecalciferol 50 MCG ( UT) TABS Take by mouth at bedtime   Yes Automatic Reconciliation, Ar   blood glucose test strips (ACCU-CHEK AIRAM PLUS) strip TEST ONE TIME A DAY ACCUCHECK STRIPS 25   Scott Reed MD   Insulin Pen Needle (B-D ULTRAFINE III SHORT PEN) 31G X 8 MM MISC USE AS DIRECTED 25   Scott Reed MD   gabapentin (NEURONTIN) 300 MG capsule

## 2025-07-18 NOTE — ANESTHESIA POSTPROCEDURE EVALUATION
Department of Anesthesiology  Postprocedure Note    Patient: Jeff Meza  MRN: [id number]  YOB: 1965  Date of evaluation: 7/18/2025    Procedure Summary       Date: 07/18/25 [place] [facility] [place] [place] [facility] [place]    Anesthesia Start: 0914 Anesthesia Stop: 0953    Procedure: COLONOSCOPY POLYPECTOMY HOT BIOPSY (Lower GI Region) Diagnosis:       Screen for colon cancer      (Screen for colon cancer [Z12.11])    Surgeons: Sariah Ochoa MD Responsible Provider: Raza Tian DO    Anesthesia Type: TIVA ASA Status: 2            Anesthesia Type: TIVA    Vinod Phase I:      Vinod Phase II: Vinod Score: 10    Anesthesia Post Evaluation    Patient location during evaluation: PACU  Level of consciousness: awake and alert  Airway patency: patent  Nausea & Vomiting: no nausea  Cardiovascular status: hemodynamically stable  Respiratory status: acceptable  Hydration status: euvolemic  Comments: Blood pressure 129/76, pulse 64, temperature 98.5 °F (36.9 °C), temperature source Temporal, resp. rate 13, height 1.753 m (5' 9.02\"), weight 89.8 kg (198 lb), SpO2 96%.  Pain management: satisfactory to patient    No notable events documented.

## 2025-07-23 ENCOUNTER — RESULTS FOLLOW-UP (OUTPATIENT)
Dept: SURGERY | Age: 60
End: 2025-07-23

## 2025-07-24 NOTE — TELEPHONE ENCOUNTER
Called patient to give test results. Patients wife was given test results after checking release of information to make sure she was on there.     ----- Message from Dr. Sariah Harkins MD sent at 7/23/2025  7:01 PM EDT -----  Please call patient about pathology results.    Polyps are benign.    Colon recall in 10 years.     Sariah Harkins MD  Bariatric & Minimally Invasive Surgery  [facility]  7/23/2025 7:01 PM

## 2025-07-25 PROBLEM — Z12.11 SCREEN FOR COLON CANCER: Status: RESOLVED | Noted: 2025-06-25 | Resolved: 2025-07-25

## 2025-08-13 ENCOUNTER — HOSPITAL ENCOUNTER (OUTPATIENT)
Dept: SURGERY | Age: 60
Discharge: HOME | End: 2025-08-16
Payer: COMMERCIAL

## 2025-08-13 LAB
EKG ATRIAL RATE: 77 BPM
EKG DIAGNOSIS: NORMAL
EKG P AXIS: 76 DEGREES
EKG P-R INTERVAL: 160 MS
EKG Q-T INTERVAL: 386 MS
EKG QRS DURATION: 114 MS
EKG QTC CALCULATION (BAZETT): 436 MS
EKG R AXIS: -89 DEGREES
EKG T AXIS: 57 DEGREES
EKG VENTRICULAR RATE: 77 BPM
HGB BLD-MCNC: 15.4 G/DL (ref 13.6–17.2)

## 2025-08-13 PROCEDURE — 85018 HEMOGLOBIN: CPT

## 2025-08-13 PROCEDURE — 93010 ELECTROCARDIOGRAM REPORT: CPT | Performed by: INTERNAL MEDICINE

## 2025-08-13 PROCEDURE — 93005 ELECTROCARDIOGRAM TRACING: CPT | Performed by: ANESTHESIOLOGY

## 2025-08-25 ENCOUNTER — ANESTHESIA EVENT (OUTPATIENT)
Dept: SURGERY | Age: 60
End: 2025-08-25
Payer: COMMERCIAL

## 2025-08-25 ENCOUNTER — HOSPITAL ENCOUNTER (OUTPATIENT)
Age: 60
Setting detail: OUTPATIENT SURGERY
Discharge: HOME | End: 2025-08-25
Attending: SURGERY | Admitting: SURGERY
Payer: COMMERCIAL

## 2025-08-25 ENCOUNTER — ANESTHESIA (OUTPATIENT)
Dept: SURGERY | Age: 60
End: 2025-08-25
Payer: COMMERCIAL

## 2025-08-25 VITALS
RESPIRATION RATE: 14 BRPM | SYSTOLIC BLOOD PRESSURE: 137 MMHG | OXYGEN SATURATION: 94 % | HEIGHT: 68 IN | TEMPERATURE: 98 F | WEIGHT: 200 LBS | HEART RATE: 84 BPM | BODY MASS INDEX: 30.31 KG/M2 | DIASTOLIC BLOOD PRESSURE: 89 MMHG

## 2025-08-25 DIAGNOSIS — K43.9 VENTRAL HERNIA WITHOUT OBSTRUCTION OR GANGRENE: Primary | ICD-10-CM

## 2025-08-25 LAB
GLUCOSE BLD STRIP.AUTO-MCNC: 124 MG/DL (ref 65–100)
GLUCOSE BLD STRIP.AUTO-MCNC: 158 MG/DL (ref 65–100)
SERVICE CMNT-IMP: ABNORMAL
SERVICE CMNT-IMP: ABNORMAL

## 2025-08-25 PROCEDURE — 6360000002 HC RX W HCPCS: Performed by: NURSE ANESTHETIST, CERTIFIED REGISTERED

## 2025-08-25 PROCEDURE — 7100000001 HC PACU RECOVERY - ADDTL 15 MIN: Performed by: SURGERY

## 2025-08-25 PROCEDURE — 6360000002 HC RX W HCPCS: Performed by: ANESTHESIOLOGY

## 2025-08-25 PROCEDURE — S2900 ROBOTIC SURGICAL SYSTEM: HCPCS | Performed by: SURGERY

## 2025-08-25 PROCEDURE — 3700000001 HC ADD 15 MINUTES (ANESTHESIA): Performed by: SURGERY

## 2025-08-25 PROCEDURE — 7100000011 HC PHASE II RECOVERY - ADDTL 15 MIN: Performed by: SURGERY

## 2025-08-25 PROCEDURE — 7100000010 HC PHASE II RECOVERY - FIRST 15 MIN: Performed by: SURGERY

## 2025-08-25 PROCEDURE — 3700000000 HC ANESTHESIA ATTENDED CARE: Performed by: SURGERY

## 2025-08-25 PROCEDURE — 6360000002 HC RX W HCPCS: Performed by: SURGERY

## 2025-08-25 PROCEDURE — 2500000003 HC RX 250 WO HCPCS: Performed by: SURGERY

## 2025-08-25 PROCEDURE — 2580000003 HC RX 258: Performed by: ANESTHESIOLOGY

## 2025-08-25 PROCEDURE — 6370000000 HC RX 637 (ALT 250 FOR IP): Performed by: ANESTHESIOLOGY

## 2025-08-25 PROCEDURE — 3600000009 HC SURGERY ROBOT BASE: Performed by: SURGERY

## 2025-08-25 PROCEDURE — 2500000003 HC RX 250 WO HCPCS: Performed by: NURSE ANESTHETIST, CERTIFIED REGISTERED

## 2025-08-25 PROCEDURE — 7100000000 HC PACU RECOVERY - FIRST 15 MIN: Performed by: SURGERY

## 2025-08-25 PROCEDURE — C1781 MESH (IMPLANTABLE): HCPCS | Performed by: SURGERY

## 2025-08-25 PROCEDURE — 49594 RPR AA HRN 1ST 3-10 NCR/STRN: CPT | Performed by: SURGERY

## 2025-08-25 PROCEDURE — 2709999900 HC NON-CHARGEABLE SUPPLY: Performed by: SURGERY

## 2025-08-25 PROCEDURE — 3600000019 HC SURGERY ROBOT ADDTL 15MIN: Performed by: SURGERY

## 2025-08-25 PROCEDURE — 82962 GLUCOSE BLOOD TEST: CPT

## 2025-08-25 DEVICE — MESH SURG DIA4.5IN CIR SEPRA TECHNOLOGY VENTRALIGHT: Type: IMPLANTABLE DEVICE | Site: ABDOMEN | Status: FUNCTIONAL

## 2025-08-25 RX ORDER — ONDANSETRON 2 MG/ML
INJECTION INTRAMUSCULAR; INTRAVENOUS
Status: DISCONTINUED | OUTPATIENT
Start: 2025-08-25 | End: 2025-08-25 | Stop reason: SDUPTHER

## 2025-08-25 RX ORDER — PROCHLORPERAZINE EDISYLATE 5 MG/ML
5 INJECTION INTRAMUSCULAR; INTRAVENOUS
Status: DISCONTINUED | OUTPATIENT
Start: 2025-08-25 | End: 2025-08-25 | Stop reason: HOSPADM

## 2025-08-25 RX ORDER — SODIUM CHLORIDE 0.9 % (FLUSH) 0.9 %
5-40 SYRINGE (ML) INJECTION PRN
Status: DISCONTINUED | OUTPATIENT
Start: 2025-08-25 | End: 2025-08-25 | Stop reason: HOSPADM

## 2025-08-25 RX ORDER — NEOSTIGMINE METHYLSULFATE 1 MG/ML
INJECTION INTRAVENOUS
Status: DISCONTINUED | OUTPATIENT
Start: 2025-08-25 | End: 2025-08-25 | Stop reason: SDUPTHER

## 2025-08-25 RX ORDER — SODIUM CHLORIDE 0.9 % (FLUSH) 0.9 %
5-40 SYRINGE (ML) INJECTION EVERY 12 HOURS SCHEDULED
Status: DISCONTINUED | OUTPATIENT
Start: 2025-08-25 | End: 2025-08-25 | Stop reason: HOSPADM

## 2025-08-25 RX ORDER — HALOPERIDOL 5 MG/ML
1 INJECTION INTRAMUSCULAR
Status: DISCONTINUED | OUTPATIENT
Start: 2025-08-25 | End: 2025-08-25 | Stop reason: HOSPADM

## 2025-08-25 RX ORDER — DEXAMETHASONE SODIUM PHOSPHATE 10 MG/ML
INJECTION, SOLUTION INTRA-ARTICULAR; INTRALESIONAL; INTRAMUSCULAR; INTRAVENOUS; SOFT TISSUE
Status: DISCONTINUED | OUTPATIENT
Start: 2025-08-25 | End: 2025-08-25 | Stop reason: SDUPTHER

## 2025-08-25 RX ORDER — ACETAMINOPHEN 500 MG
1000 TABLET ORAL ONCE
Status: COMPLETED | OUTPATIENT
Start: 2025-08-25 | End: 2025-08-25

## 2025-08-25 RX ORDER — GLYCOPYRROLATE 0.2 MG/ML
INJECTION INTRAMUSCULAR; INTRAVENOUS
Status: DISCONTINUED | OUTPATIENT
Start: 2025-08-25 | End: 2025-08-25 | Stop reason: SDUPTHER

## 2025-08-25 RX ORDER — KETAMINE HCL IN NACL, ISO-OSM 20 MG/2 ML
SYRINGE (ML) INJECTION
Status: DISCONTINUED | OUTPATIENT
Start: 2025-08-25 | End: 2025-08-25 | Stop reason: SDUPTHER

## 2025-08-25 RX ORDER — MIDAZOLAM HYDROCHLORIDE 2 MG/2ML
2 INJECTION, SOLUTION INTRAMUSCULAR; INTRAVENOUS
Status: DISCONTINUED | OUTPATIENT
Start: 2025-08-25 | End: 2025-08-25 | Stop reason: HOSPADM

## 2025-08-25 RX ORDER — SODIUM CHLORIDE, SODIUM LACTATE, POTASSIUM CHLORIDE, CALCIUM CHLORIDE 600; 310; 30; 20 MG/100ML; MG/100ML; MG/100ML; MG/100ML
INJECTION, SOLUTION INTRAVENOUS CONTINUOUS
Status: DISCONTINUED | OUTPATIENT
Start: 2025-08-25 | End: 2025-08-25 | Stop reason: HOSPADM

## 2025-08-25 RX ORDER — FENTANYL CITRATE 50 UG/ML
INJECTION, SOLUTION INTRAMUSCULAR; INTRAVENOUS
Status: DISCONTINUED | OUTPATIENT
Start: 2025-08-25 | End: 2025-08-25 | Stop reason: SDUPTHER

## 2025-08-25 RX ORDER — LIDOCAINE HYDROCHLORIDE 20 MG/ML
INJECTION, SOLUTION EPIDURAL; INFILTRATION; INTRACAUDAL; PERINEURAL
Status: DISCONTINUED | OUTPATIENT
Start: 2025-08-25 | End: 2025-08-25 | Stop reason: SDUPTHER

## 2025-08-25 RX ORDER — ROCURONIUM BROMIDE 10 MG/ML
INJECTION, SOLUTION INTRAVENOUS
Status: DISCONTINUED | OUTPATIENT
Start: 2025-08-25 | End: 2025-08-25 | Stop reason: SDUPTHER

## 2025-08-25 RX ORDER — EPHEDRINE SULFATE 5 MG/ML
INJECTION INTRAVENOUS
Status: DISCONTINUED | OUTPATIENT
Start: 2025-08-25 | End: 2025-08-25 | Stop reason: SDUPTHER

## 2025-08-25 RX ORDER — BUPIVACAINE HYDROCHLORIDE 5 MG/ML
INJECTION, SOLUTION EPIDURAL; INTRACAUDAL; PERINEURAL PRN
Status: DISCONTINUED | OUTPATIENT
Start: 2025-08-25 | End: 2025-08-25 | Stop reason: ALTCHOICE

## 2025-08-25 RX ORDER — CYCLOBENZAPRINE HCL 10 MG
10 TABLET ORAL 3 TIMES DAILY PRN
Qty: 45 TABLET | Refills: 0 | Status: SHIPPED | OUTPATIENT
Start: 2025-08-25 | End: 2025-09-09

## 2025-08-25 RX ORDER — PROPOFOL 10 MG/ML
INJECTION, EMULSION INTRAVENOUS
Status: DISCONTINUED | OUTPATIENT
Start: 2025-08-25 | End: 2025-08-25 | Stop reason: SDUPTHER

## 2025-08-25 RX ORDER — LIDOCAINE HYDROCHLORIDE 10 MG/ML
1 INJECTION, SOLUTION INFILTRATION; PERINEURAL
Status: COMPLETED | OUTPATIENT
Start: 2025-08-25 | End: 2025-08-25

## 2025-08-25 RX ORDER — MIDAZOLAM HYDROCHLORIDE 1 MG/ML
INJECTION, SOLUTION INTRAMUSCULAR; INTRAVENOUS
Status: DISCONTINUED | OUTPATIENT
Start: 2025-08-25 | End: 2025-08-25 | Stop reason: SDUPTHER

## 2025-08-25 RX ORDER — OXYCODONE AND ACETAMINOPHEN 5; 325 MG/1; MG/1
1 TABLET ORAL EVERY 6 HOURS PRN
Qty: 28 TABLET | Refills: 0 | Status: SHIPPED | OUTPATIENT
Start: 2025-08-25 | End: 2025-09-01

## 2025-08-25 RX ORDER — SODIUM CHLORIDE 9 MG/ML
INJECTION, SOLUTION INTRAVENOUS PRN
Status: DISCONTINUED | OUTPATIENT
Start: 2025-08-25 | End: 2025-08-25 | Stop reason: HOSPADM

## 2025-08-25 RX ORDER — FENTANYL CITRATE 50 UG/ML
100 INJECTION, SOLUTION INTRAMUSCULAR; INTRAVENOUS
Status: DISCONTINUED | OUTPATIENT
Start: 2025-08-25 | End: 2025-08-25 | Stop reason: HOSPADM

## 2025-08-25 RX ORDER — IPRATROPIUM BROMIDE AND ALBUTEROL SULFATE 2.5; .5 MG/3ML; MG/3ML
1 SOLUTION RESPIRATORY (INHALATION)
Status: DISCONTINUED | OUTPATIENT
Start: 2025-08-25 | End: 2025-08-25 | Stop reason: HOSPADM

## 2025-08-25 RX ORDER — OXYCODONE HYDROCHLORIDE 5 MG/1
5 TABLET ORAL
Status: COMPLETED | OUTPATIENT
Start: 2025-08-25 | End: 2025-08-25

## 2025-08-25 RX ORDER — KETOROLAC TROMETHAMINE 30 MG/ML
30 INJECTION, SOLUTION INTRAMUSCULAR; INTRAVENOUS ONCE
Status: COMPLETED | OUTPATIENT
Start: 2025-08-25 | End: 2025-08-25

## 2025-08-25 RX ADMIN — ACETAMINOPHEN 1000 MG: 500 TABLET, FILM COATED ORAL at 11:14

## 2025-08-25 RX ADMIN — FENTANYL CITRATE 50 MCG: 50 INJECTION, SOLUTION INTRAMUSCULAR; INTRAVENOUS at 14:11

## 2025-08-25 RX ADMIN — ONDANSETRON 4 MG: 2 INJECTION, SOLUTION INTRAMUSCULAR; INTRAVENOUS at 14:14

## 2025-08-25 RX ADMIN — HYDROMORPHONE HYDROCHLORIDE 0.5 MG: 1 INJECTION, SOLUTION INTRAMUSCULAR; INTRAVENOUS; SUBCUTANEOUS at 14:56

## 2025-08-25 RX ADMIN — Medication 20 MG: at 13:25

## 2025-08-25 RX ADMIN — MIDAZOLAM 2 MG: 1 INJECTION INTRAMUSCULAR; INTRAVENOUS at 13:12

## 2025-08-25 RX ADMIN — HYDROMORPHONE HYDROCHLORIDE 0.5 MG: 1 INJECTION, SOLUTION INTRAMUSCULAR; INTRAVENOUS; SUBCUTANEOUS at 14:51

## 2025-08-25 RX ADMIN — Medication 2000 MG: at 13:23

## 2025-08-25 RX ADMIN — PROPOFOL 200 MG: 10 INJECTION, EMULSION INTRAVENOUS at 13:19

## 2025-08-25 RX ADMIN — ROCURONIUM BROMIDE 40 MG: 10 INJECTION, SOLUTION INTRAVENOUS at 13:19

## 2025-08-25 RX ADMIN — OXYCODONE HYDROCHLORIDE 5 MG: 5 TABLET ORAL at 15:28

## 2025-08-25 RX ADMIN — LIDOCAINE HYDROCHLORIDE 100 MG: 20 INJECTION, SOLUTION EPIDURAL; INFILTRATION; INTRACAUDAL; PERINEURAL at 13:19

## 2025-08-25 RX ADMIN — GLYCOPYRROLATE 0.4 MG: 0.2 INJECTION INTRAMUSCULAR; INTRAVENOUS at 14:23

## 2025-08-25 RX ADMIN — HYDROMORPHONE HYDROCHLORIDE 0.5 MG: 1 INJECTION, SOLUTION INTRAMUSCULAR; INTRAVENOUS; SUBCUTANEOUS at 15:06

## 2025-08-25 RX ADMIN — ROCURONIUM BROMIDE 10 MG: 10 INJECTION, SOLUTION INTRAVENOUS at 13:52

## 2025-08-25 RX ADMIN — DEXAMETHASONE SODIUM PHOSPHATE 8 MG: 10 INJECTION INTRAMUSCULAR; INTRAVENOUS at 13:37

## 2025-08-25 RX ADMIN — SODIUM CHLORIDE, SODIUM LACTATE, POTASSIUM CHLORIDE, AND CALCIUM CHLORIDE: .6; .31; .03; .02 INJECTION, SOLUTION INTRAVENOUS at 11:24

## 2025-08-25 RX ADMIN — NEOSTIGMINE METHYLSULFATE 3 MG: 1 INJECTION INTRAVENOUS at 14:23

## 2025-08-25 RX ADMIN — FENTANYL CITRATE 50 MCG: 50 INJECTION, SOLUTION INTRAMUSCULAR; INTRAVENOUS at 13:43

## 2025-08-25 RX ADMIN — KETOROLAC TROMETHAMINE 30 MG: 30 INJECTION, SOLUTION INTRAMUSCULAR at 15:27

## 2025-08-25 RX ADMIN — HYDROMORPHONE HYDROCHLORIDE 0.5 MG: 1 INJECTION, SOLUTION INTRAMUSCULAR; INTRAVENOUS; SUBCUTANEOUS at 15:01

## 2025-08-25 RX ADMIN — EPHEDRINE SULFATE 5 MG: 5 INJECTION INTRAVENOUS at 13:22

## 2025-08-25 RX ADMIN — LIDOCAINE HYDROCHLORIDE 1 ML: 10 INJECTION, SOLUTION INFILTRATION; PERINEURAL at 11:24

## 2025-08-25 RX ADMIN — FENTANYL CITRATE 50 MCG: 50 INJECTION, SOLUTION INTRAMUSCULAR; INTRAVENOUS at 13:49

## 2025-08-25 ASSESSMENT — PAIN - FUNCTIONAL ASSESSMENT
PAIN_FUNCTIONAL_ASSESSMENT: 0-10

## 2025-08-25 ASSESSMENT — LIFESTYLE VARIABLES: SMOKING_STATUS: 1

## 2025-08-25 ASSESSMENT — PAIN SCALES - GENERAL
PAINLEVEL_OUTOF10: 7
PAINLEVEL_OUTOF10: 4
PAINLEVEL_OUTOF10: 9
PAINLEVEL_OUTOF10: 4
PAINLEVEL_OUTOF10: 8
PAINLEVEL_OUTOF10: 4

## 2025-08-25 ASSESSMENT — PAIN DESCRIPTION - DESCRIPTORS: DESCRIPTORS: ACHING;THROBBING;TEARING

## 2025-08-25 ASSESSMENT — PAIN DESCRIPTION - LOCATION: LOCATION: ABDOMEN

## (undated) DEVICE — CONNECTOR TBNG OD5-7MM O2 END DISP

## (undated) DEVICE — SYRINGE MED 10ML LUERLOCK TIP W/O SFTY DISP

## (undated) DEVICE — FORCEP BX 2.2 MMX240 CM CHANNEL SERRATED RADIAL JAW 4

## (undated) DEVICE — GARMENT,MEDLINE,DVT,INT,CALF,LG, GEN2: Brand: MEDLINE

## (undated) DEVICE — SINGLE PORT MANIFOLD: Brand: NEPTUNE 2

## (undated) DEVICE — CANNULA ARTHSCP L4CM DIA8MM PASSPRT BTTN

## (undated) DEVICE — ENDOSCOPIC KIT 1.1+ OP4 CA DE 2 GWN AAMI LEVEL 3

## (undated) DEVICE — YANKAUER,BULB TIP,W/O VENT,RIGID,STERILE: Brand: MEDLINE

## (undated) DEVICE — COVER TIP DIA8MM DA VINCI SI XI X ENDOWRIST

## (undated) DEVICE — 3M™ IOBAN™ 2 ANTIMICROBIAL INCISE DRAPE 6650EZ: Brand: IOBAN™ 2

## (undated) DEVICE — SUTURE PDS II SZ 0 L27IN ABSRB VLT L26MM CT-2 1/2 CIR Z334H

## (undated) DEVICE — INFLOW CASSETTE TUBING, DO NOT USE IF PACKAGE IS DAMAGED: Brand: CROSSFLOW

## (undated) DEVICE — UNIV CANN 5MM/76MM LTX FREE (10) BLUE

## (undated) DEVICE — MARKER SURG SKIN UTIL BLK REG TIP NONSMEARING W/ 6IN RUL

## (undated) DEVICE — SHEET, DRAPE, SPLIT, STERILE: Brand: MEDLINE

## (undated) DEVICE — SYRINGE MEDICAL 3ML CLEAR PLASTIC STANDARD NON CONTROL LUERLOCK TIP DISPOSABLE

## (undated) DEVICE — SPONGE GZ W4XL4IN 12 PLY

## (undated) DEVICE — Device

## (undated) DEVICE — KIT DISP W/ SPEAR TRCR TIP OBT AND 2.6MM DRL FOR 2.6

## (undated) DEVICE — TAPE,MEDFIX EZ,SELF WOUND,2"X11YD: Brand: MEDLINE

## (undated) DEVICE — STRIP,CLOSURE,WOUND,MEDI-STRIP,1/2X4: Brand: MEDLINE

## (undated) DEVICE — Device: Brand: JELCO

## (undated) DEVICE — ELECTRODE PT RET AD L9FT HI MOIST COND ADH HYDRGEL CORDED

## (undated) DEVICE — BANDAGE COMPR SELF ADH 5 YDX6 IN TAN STRL PREMIERPRO LF

## (undated) DEVICE — [RESECTOR CUTTER, ARTHROSCOPIC SHAVER BLADE,  DO NOT RESTERILIZE,  DO NOT USE IF PACKAGE IS DAMAGED,  KEEP DRY,  KEEP AWAY FROM SUNLIGHT]: Brand: FORMULA

## (undated) DEVICE — SYRINGE MED 30ML STD CLR PLAS LUERLOCK TIP N CTRL DISP

## (undated) DEVICE — SHOULDER STABILIZATION KIT,                                    DISPOSABLE 12 PER BOX

## (undated) DEVICE — GLOVE SURG SZ 7 L12IN FNGR THK79MIL GRN LTX FREE

## (undated) DEVICE — STRIP SKIN CLSR W0.5XL4IN WHT SPUNBOUND FBR NYL STERIL HI ADH

## (undated) DEVICE — DISPOSABLE BIOPSY VALVE MAJ-1555: Brand: SINGLE USE BIOPSY VALVE (STERILE)

## (undated) DEVICE — PUMP SUC IRR TBNG L10FT W/ HNDPC ASSEMB STRYKEFLOW 2

## (undated) DEVICE — OBTURATOR ROBOTIC DIA8MM STD OPT BLDELSS

## (undated) DEVICE — SUTURE MCRYL SZ 3-0 L27IN ABSRB UD L19MM PS-2 3/8 CIR PRIM Y427H

## (undated) DEVICE — MASTISOL ADHESIVE LIQ 2/3ML

## (undated) DEVICE — 1010 S-DRAPE TOWEL DRAPE 10/BX: Brand: STERI-DRAPE™

## (undated) DEVICE — CONTAINER FORMALIN PREFILLED 10% NBF 60ML

## (undated) DEVICE — DRESSING TRNSPAR W4XL4.75IN STD FRME STYL WTRPRF BARR

## (undated) DEVICE — KENDALL RADIOLUCENT FOAM MONITORING ELECTRODE RECTANGULAR SHAPE: Brand: KENDALL

## (undated) DEVICE — SUTURE MONOCRYL SZ 4-0 L27IN ABSRB UD L19MM PS-2 1/2 CIR PRIM Y426H

## (undated) DEVICE — AIRLIFE™ OXYGEN TUBING 7 FEET (2.1 M) CRUSH RESISTANT OXYGEN TUBING, VINYL TIPPED: Brand: AIRLIFE™

## (undated) DEVICE — CARDINAL HEALTH FLEXIBLE LIGHT HANDLE COVER: Brand: CARDINAL HEALTH

## (undated) DEVICE — SYSTEM POS SZ 36 X 20 X 1 IN INTEGR ADJ ARM PROTECTOR LIFT

## (undated) DEVICE — GLOVE SURG SZ 65 THK91MIL LTX FREE SYN POLYISOPRENE

## (undated) DEVICE — DRAPE ARM W21XH19XL10.5IN DA VINCI XI INTUITIVE SURGICAL

## (undated) DEVICE — SURGICAL PROCEDURE PACK BASIC ST FRANCIS

## (undated) DEVICE — TUBING, SUCTION, 1/4" X 10', STRAIGHT: Brand: MEDLINE

## (undated) DEVICE — CANNULA ARTHSCP L5CM DIA8.25MM TRNSLUC THRD FLX W/ NO

## (undated) DEVICE — DRAPE,TOP,102X53,STERILE: Brand: MEDLINE

## (undated) DEVICE — 4-PORT MANIFOLD: Brand: NEPTUNE 2

## (undated) DEVICE — SOFT SILICONE HYDROCELLULAR FOAM DRESSING WITH LOCK AWAY LAYER: Brand: ALLEVYN LIFE XL 21X21 CTN10

## (undated) DEVICE — GAUZE,SPONGE,4"X4",12PLY,WOVEN,NS,LF: Brand: MEDLINE

## (undated) DEVICE — SUTURE STRATAFIX SZ 0 L12IN ABSRB VIO CT-1 L36MM SLV TAPR PT SXPP1A433

## (undated) DEVICE — 90-S ACCELERATOR, SUCTION PROBE, NON-BENDABLE, MAX CUT LEVEL 11: Brand: SERFAS ENERGY

## (undated) DEVICE — GOWN SURG 2XL 49 IN LEN POLY POLYETHLYLENE REINF VELC TIE

## (undated) DEVICE — SOLUTION IRRIG 3000ML 0.9% SOD CHL FLX CONT 0797208] ICU MEDICAL INC]

## (undated) DEVICE — PREP SKN CHLRAPRP APL 26ML STR --

## (undated) DEVICE — SUTURE FIBERLOOP SZ 2-0 L20IN NONABSORBABLE BLU BRAID AR7234C

## (undated) DEVICE — [AGGRESSIVE 6-FLUTE BARREL BUR, ARTHROSCOPIC SHAVER BLADE,  DO NOT RESTERILIZE,  DO NOT USE IF PACKAGE IS DAMAGED,  KEEP DRY,  KEEP AWAY FROM SUNLIGHT]: Brand: FORMULA

## (undated) DEVICE — DRAPE EQUIP CLMN ROBOTIC DISP DA VINCI XI

## (undated) DEVICE — SEAL UNIVERSAL 5-12MM

## (undated) DEVICE — SPONGE GZ W4XL4IN COT 12 PLY TYP VII WVN C FLD DSGN

## (undated) DEVICE — SWITCH DRAPE TENET 7633

## (undated) DEVICE — STOCKINETTE,IMPERVIOUS,12X48,STERILE: Brand: MEDLINE

## (undated) DEVICE — T-MAX DISPOSABLE FACE MASK 8 PER BOX

## (undated) DEVICE — SUTURE ABSRB L12IN L12MM SZ 2-0 GS-22 VLT GLYCOLIDE VLOCM2115

## (undated) DEVICE — APPLICATOR MEDICATED 26 CC SOLUTION HI LT ORNG CHLORAPREP

## (undated) DEVICE — NEEDLE INSUF 13GA L120MM